# Patient Record
Sex: FEMALE | Race: WHITE | NOT HISPANIC OR LATINO | Employment: FULL TIME | URBAN - METROPOLITAN AREA
[De-identification: names, ages, dates, MRNs, and addresses within clinical notes are randomized per-mention and may not be internally consistent; named-entity substitution may affect disease eponyms.]

---

## 2017-12-13 ENCOUNTER — OFFICE VISIT (OUTPATIENT)
Dept: DERMATOLOGY | Facility: CLINIC | Age: 47
End: 2017-12-13
Payer: COMMERCIAL

## 2017-12-13 DIAGNOSIS — D48.5 NEOPLASM OF UNCERTAIN BEHAVIOR OF SKIN: Primary | ICD-10-CM

## 2017-12-13 DIAGNOSIS — L57.0 ACTINIC KERATOSIS: ICD-10-CM

## 2017-12-13 DIAGNOSIS — Z85.828 HISTORY OF SKIN CANCER: ICD-10-CM

## 2017-12-13 DIAGNOSIS — L82.1 SEBORRHEIC KERATOSIS: ICD-10-CM

## 2017-12-13 DIAGNOSIS — Z12.83 SKIN CANCER SCREENING: ICD-10-CM

## 2017-12-13 RX ORDER — TRETINOIN 0.25 MG/G
CREAM TOPICAL
Qty: 135 G | Refills: 12 | Status: SHIPPED | OUTPATIENT
Start: 2017-12-13 | End: 2021-11-17

## 2017-12-13 ASSESSMENT — PAIN SCALES - GENERAL: PAINLEVEL: NO PAIN (0)

## 2017-12-13 NOTE — PROGRESS NOTES
[facility] Dermatology Note    Dermatology Problem List:  1. Hx of NMSC  - BCC, left neck s/p biopsy 4/20/10  - SCC, sternum s/p biopsy 8/26/11  2. AKs  - s/p cryo  - s/p course of Efudex x 1 month  - tretinoin 0.025% cream  3. NUB, central upper chest s/p biopsy 12/13/17  4. Skin cancer screening 12/13/17    CC:   Chief Complaint   Patient presents with     Skin Check     Annual skin check. Personal hx of BCC and SCC, no spots of concern today.     Date of Service: Dec 13, 2017    History of Present Illness:  Ms. Brenda Carbone is a 47 year old female who presents for an annual skin check. The patient was last seen on 11/11/16 by Dr. Marte when she was reassured that there was no recurrent skin cancer. Today the patient reports that she uses the tretinoin cream, but forgets to use it. She states that she just restarted using the topical medication. She also reports that there is a spot on her chest that does not heal and is almost always a scab. . The patient reports no other lesions of concern at this time.    Otherwise, the patient reports no painful, bleeding, nonhealing, or pruritic lesions, and denies new or changing moles.    Past Medical History:   Patient Active Problem List   Diagnosis     Pain in joint, other specified sites     Adjustment disorder with mixed anxiety and depressed mood     Attention deficit hyperactivity disorder (ADHD)     Benign neoplasm of skin of trunk, except scrotum     AK (actinic keratosis)     Past Medical History:   Diagnosis Date     Actinic keratosis      Basal cell carcinoma      Closed fracture of one or more phalanges of foot 01/13/2000    (R) great toe abstract     Depressive disorder, not elsewhere classified      Hernia of unspecified site of abdominal cavity without mention of obstruction or gangrene 11/09/1998    hx (R) inguinal abstract     Post term pregnancy, unspecified episode of care 01/12/1999    childbirth baby boy abstract     Seborrheic  keratosis     (L) breast, benign     Squamous cell carcinoma      Past Surgical History:   Procedure Laterality Date     BIOPSY OF SKIN LESION       C VAGINAL HYSTERECTOMY  1/7/10     HC EXC BENIGN SKIN LESION SCLP/NCK/HNDS/FEET/GEN 1.1-2.0 CM  1/7/10    lt     HC LAP,INGUINAL HERNIA REPR,INITIAL  05/25/05     HC LAP,TUBAL CAUTERY  05/25/05     MOHS MICROGRAPHIC PROCEDURE       RW OTHER (ABSTRACTED)  1/07    breast augmentation     Social History:  Grew up in [place]. Annual trips to [facility] when she was younger for 1 month, laid in the sun for many hours.  Admits to a history of tanning abdul use.    Family History:  Not addressed at this visit.    Medications:  Current Outpatient Prescriptions   Medication Sig Dispense Refill     SUMAtriptan (IMITREX) 100 MG tablet Take 1 tablet (100 mg) by mouth See Admin Instructions WITH ONSET OF MIGRAINE, MAY REPEAT ONCE AFTER 2 HOURS. DO NOT EXCEED 2 TABLETS IN 24 HOURS. 30 tablet 3     tretinoin (RETIN-A) 0.025 % cream Use every night 135 g 12     Multiple Vitamins-Minerals (MULTIVITAMIN OR) Take 1 tablet by mouth daily       amphetamine-dextroamphetamine (ADDERALL) 10 MG tablet Take 1 tablet (10 mg) by mouth 2 times daily (Patient not taking: Reported on 12/13/2017) 60 tablet 0     Allergies:  Allergies   Allergen Reactions     No Known Allergies      No Known Drug Allergy      Review of Systems:  - Skin: As above in HPI. No additional skin concerns.    Physical exam:  Vitals: LMP 10/22/2009  GEN: This is a well developed, well-nourished female in no acute distress, in a pleasant mood.    Tanned skin.   SKIN: Full skin, which includes the head/face, both arms, chest, back, abdomen,both legs, genitalia and/or groin buttocks, digits and/or nails, was examined.  - Gritty pink papules on the left cheek, left upper forehead, left upper central chest, left breast  - Regular brown pigmented macules and papules are identified on the trunk, extremities.   - Stuck on tan to brown  papules on the back  - Scattered brown macules on the upper back, upper chest, and shoulders  - 5 mm thin pearly papule with telangiectasis on the central upper chest    - No other lesions of concern on areas examined.    Impression/Plan:  1. Hx of NMSC  - No evidence of recurrence. Continue regular skin checks.    2. Actinic keratoses - left cheek, left upper forehead, left upper central chest, left breast  - Continue tretinoin 0.025% cream - apply to face every night.  - Cryotherapy procedure note: After verbal consent and discussion of risks and benefits including but no limited to dyspigmentation/scar, blister, and pain, 4 was(were) treated with 1-2mm freeze border for 2 cycles with liquid nitrogen. Post cryotherapy instructions were provided.     3. Clinically benign nevi - trunk, extremities  - No further intervention required. Patient to report changes.   - Sun precaution was advised including the use of sun screens of SPF 30 or higher, sun protective clothing, and avoidance of tanning beds.    4. Seborrheic keratoses - back  - No further intervention required. Patient to report changes.     5. Solar lentigines - upper back, upper chest, shoulders  - No further intervention required. Patient to report changes.     6. NUB - central upper chest. Neoplasm of uncertain behavior. Differential diagnosis includes r/o BCC vs BLK vs AK vs other  - Shave biopsy:  After discussion of benefits and risks including but not limited to bleeding/bruising, pain/swelling, infection, scar, incomplete removal, nerve damage/numbness, recurrence, and non-diagnostic biopsy, written consent, verbal consent and photographs were obtained. Time-out was performed. The area was cleaned with isopropyl alcohol and was injected to obtain adequate anesthesia of the lesion on the central upper chest. 0.5 ml of 1% lidocaine was injected to obtain adequate anesthesia. A  shave biopsy was performed. Hemostasis was achieved with aluminium  chloride. Vaseline and a sterile dressing were applied. The patient tolerated the procedure and no complications were noted. The patient was provided with verbal and written post care instructions.      Follow-up in 1 year, earlier for new or changing lesions.    Staff Involved:  Staff Only    Scribe Disclosure:   I, Bharat Zelaya, am serving as a scribe to document services personally performed by Ree Pate PA-C, based on data collection and the provider's statements to me.    Provider Disclosure:   The documentation recorded by the scribe accurately reflects the services I personally performed and the decisions made by me.    All risks, benefits and alternatives were discussed with patient.  Patient is in agreement and understands the assessment and plan.  All questions were answered.  Sun Screen Education was given.   Return to Clinic annually or sooner as needed.   Ree Pate PA-C   [facility]

## 2017-12-13 NOTE — NURSING NOTE
Lidocaine  05mL once for one use, starting 12/13/2017 ending 12/13/2017,  2mL disp, R-0, injection  Injected by Roxie Chatterjee, CMA

## 2017-12-13 NOTE — PATIENT INSTRUCTIONS
Cryotherapy    What is it?    Use of a very cold liquid, such as liquid nitrogen, to freeze and destroy abnormal skin cells that need to be removed    What should I expect?    Tenderness and redness    A small blister that might grow and fill with dark purple blood. There may be crusting.    More than one treatment may be needed if the lesions do not go away.    How do I care for the treated area?    Gently wash the area with your hands when bathing.    Use a thin layer of Vaseline to help with healing. You may use a Band-Aid.     The area should heal within 7-10 days and may leave behind a pink or lighter color.     Do not use an antibiotic or Neosporin ointment.     You may take acetaminophen (Tylenol) for pain.     Call your Doctor if you have:    Severe pain    Signs of infection (warmth, redness, cloudy yellow drainage, and or a bad smell)    Questions or concerns    Who should I call with questions?       [facility]: [phone]       [facility]: [phone]       For urgent needs outside of business hours call the [facility] at [phone]        and ask for the dermatology resident on call    Wound Care After a Biopsy    What is a skin biopsy?  A skin biopsy allows the doctor to examine a very small piece of tissue under the microscope to determine the diagnosis and the best treatment for the skin condition. A local anesthetic (numbing medicine)  is injected with a very small needle into the skin area to be tested. A small piece of skin is taken from the area. Sometimes a suture (stitch) is used.     What are the risks of a skin biopsy?  I will experience scar, bleeding, swelling, pain, crusting and redness. I may experience incomplete removal or recurrence. Risks of this procedure are excessive bleeding, bruising, infection, nerve damage, numbness, thick (hypertrophic or keloidal) scar and non-diagnostic biopsy.    How should I care  for my wound for the first 24 hours?    Keep the wound dry and covered for 24 hours    If it bleeds, hold direct pressure on the area for 15 minutes. If bleeding does not stop then go to the emergency room    Avoid strenuous exercise the first 1-2 days or as your doctor instructs you    How should I care for the wound after 24 hours?    After 24 hours, remove the bandage    You may bathe or shower as normal    If you had a scalp biopsy, you can shampoo as usual and can use shower water to clean the biopsy site daily    Clean the wound twice a day with gentle soap and water    Do not scrub, be gentle    Apply white petroleum/Vaseline after cleaning the wound with a cotton swab or a clean finger, and keep the site covered with a Bandaid /bandage. Bandages are not necessary with a scalp biopsy    If you are unable to cover the site with a Bandaid /bandage, re-apply ointment 2-3 times a day to keep the site moist. Moisture will help with healing    Avoid strenuous activity for first 1-2 days    Avoid lakes, rivers, pools, and oceans until the stitches are removed or the site is healed    How do I clean my wound?    Wash hands thoroughly with soap or use hand  before all wound care    Clean the wound with gentle soap and water    Apply white petroleum/Vaseline  to wound after it is clean    Replace the Bandaid /bandage to keep the wound covered for the first few days or as instructed by your doctor    If you had a scalp biopsy, warm shower water to the area on a daily basis should suffice    What should I use to clean my wound?     Cotton-tipped applicators (Qtips )    White petroleum jelly (Vaseline ). Use a clean new container and use Q-tips to apply.    Bandaids   as needed    Gentle soap     How should I care for my wound long term?    Do not get your wound dirty    Keep up with wound care for one week or until the area is healed.    A small scab will form and fall off by itself when the area is completely  healed. The area will be red and will become pink in color as it heals. Sun protection is very important for how your scar will turn out. Sunscreen with an SPF 30 or greater is recommended once the area is healed.    You should have some soreness but it should be mild and slowly go away over several days. Talk to your doctor about using tylenol for pain,    When should I call my doctor?  If you have increased:     Pain or swelling    Pus or drainage (clear or slightly yellow drainage is ok)    Temperature over 100F    Spreading redness or warmth around wound    When will I hear about my results?  The biopsy results can take 2-3 weeks to come back. The clinic will call you with the results, send you a [employer] message, or have you schedule a follow-up clinic or phone time to discuss the results. Contact our clinics if you do not hear from us in 3 weeks.     Who should I call with questions?    [facility]: [phone]     [facility]: [phone]    For urgent needs outside of business hours call the [facility] at [phone] and ask for the dermatology resident on call

## 2017-12-13 NOTE — NURSING NOTE
Dermatology Rooming Note    Brenda Carbone's goals for this visit include:   Chief Complaint   Patient presents with     Skin Check     Annual skin check. Personal hx of BCC and SCC, no spots of concern today.     Roxie Chatterjee, CMA

## 2017-12-13 NOTE — MR AVS SNAPSHOT
After Visit Summary   12/13/2017    Brenda Carbone    MRN: [id number]           Patient Information     Date Of Birth          1970        Visit Information        Provider Department      12/13/2017 2:15 PM Ree Pate PA-C [facility] Dermatology        Today's Diagnoses     Neoplasm of uncertain behavior of skin    -  1    Actinic keratosis        Skin cancer screening        Seborrheic keratosis        History of skin cancer          Care Instructions    Cryotherapy    What is it?    Use of a very cold liquid, such as liquid nitrogen, to freeze and destroy abnormal skin cells that need to be removed    What should I expect?    Tenderness and redness    A small blister that might grow and fill with dark purple blood. There may be crusting.    More than one treatment may be needed if the lesions do not go away.    How do I care for the treated area?    Gently wash the area with your hands when bathing.    Use a thin layer of Vaseline to help with healing. You may use a Band-Aid.     The area should heal within 7-10 days and may leave behind a pink or lighter color.     Do not use an antibiotic or Neosporin ointment.     You may take acetaminophen (Tylenol) for pain.     Call your Doctor if you have:    Severe pain    Signs of infection (warmth, redness, cloudy yellow drainage, and or a bad smell)    Questions or concerns    Who should I call with questions?       [facility]: [phone]       [facility]: [phone]       For urgent needs outside of business hours call the [facility] at [phone]        and ask for the dermatology resident on call    Wound Care After a Biopsy    What is a skin biopsy?  A skin biopsy allows the doctor to examine a very small piece of tissue under the microscope to determine the diagnosis and the best treatment for the skin condition. A local anesthetic (numbing medicine)  is  injected with a very small needle into the skin area to be tested. A small piece of skin is taken from the area. Sometimes a suture (stitch) is used.     What are the risks of a skin biopsy?  I will experience scar, bleeding, swelling, pain, crusting and redness. I may experience incomplete removal or recurrence. Risks of this procedure are excessive bleeding, bruising, infection, nerve damage, numbness, thick (hypertrophic or keloidal) scar and non-diagnostic biopsy.    How should I care for my wound for the first 24 hours?    Keep the wound dry and covered for 24 hours    If it bleeds, hold direct pressure on the area for 15 minutes. If bleeding does not stop then go to the emergency room    Avoid strenuous exercise the first 1-2 days or as your doctor instructs you    How should I care for the wound after 24 hours?    After 24 hours, remove the bandage    You may bathe or shower as normal    If you had a scalp biopsy, you can shampoo as usual and can use shower water to clean the biopsy site daily    Clean the wound twice a day with gentle soap and water    Do not scrub, be gentle    Apply white petroleum/Vaseline after cleaning the wound with a cotton swab or a clean finger, and keep the site covered with a Bandaid /bandage. Bandages are not necessary with a scalp biopsy    If you are unable to cover the site with a Bandaid /bandage, re-apply ointment 2-3 times a day to keep the site moist. Moisture will help with healing    Avoid strenuous activity for first 1-2 days    Avoid lakes, rivers, pools, and oceans until the stitches are removed or the site is healed    How do I clean my wound?    Wash hands thoroughly with soap or use hand  before all wound care    Clean the wound with gentle soap and water    Apply white petroleum/Vaseline  to wound after it is clean    Replace the Bandaid /bandage to keep the wound covered for the first few days or as instructed by your doctor    If you had a scalp biopsy,  warm shower water to the area on a daily basis should suffice    What should I use to clean my wound?     Cotton-tipped applicators (Qtips )    White petroleum jelly (Vaseline ). Use a clean new container and use Q-tips to apply.    Bandaids   as needed    Gentle soap     How should I care for my wound long term?    Do not get your wound dirty    Keep up with wound care for one week or until the area is healed.    A small scab will form and fall off by itself when the area is completely healed. The area will be red and will become pink in color as it heals. Sun protection is very important for how your scar will turn out. Sunscreen with an SPF 30 or greater is recommended once the area is healed.    You should have some soreness but it should be mild and slowly go away over several days. Talk to your doctor about using tylenol for pain,    When should I call my doctor?  If you have increased:     Pain or swelling    Pus or drainage (clear or slightly yellow drainage is ok)    Temperature over 100F    Spreading redness or warmth around wound    When will I hear about my results?  The biopsy results can take 2-3 weeks to come back. The clinic will call you with the results, send you a [employer] message, or have you schedule a follow-up clinic or phone time to discuss the results. Contact our clinics if you do not hear from us in 3 weeks.     Who should I call with questions?    [facility]: [phone]     [facility]: [phone]    For urgent needs outside of business hours call the [facility] at [phone] and ask for the dermatology resident on call          Follow-ups after your visit        Follow-up notes from your care team     Return in about 1 year (around 12/13/2018).      Who to contact     Please call your clinic at [phone] to:    Ask questions about your health    Make or cancel appointments    Discuss your medicines    Learn  about your test results    Speak to your doctor   If you have compliments or concerns about an experience at your clinic, or if you wish to file a complaint, please contact [facility] Patient Relations at [phone] or email us at [email or web address]         Additional Information About Your Visit        [employer] Information     [employer] gives you secure access to your electronic health record. If you see a primary care provider, you can also send messages to your care team and make appointments. If you have questions, please call your primary care clinic.  If you do not have a primary care provider, please call [phone] and they will assist you.      [employer] [place] is an electronic gateway that provides easy, online access to your medical records. With [employer] [place], you can request a clinic appointment, read your test results, renew a prescription or communicate with your care team.     To access your existing account, please contact your [facility] or call [phone] for assistance.        Care EveryWhere ID     This is your Care EveryWhere ID. This could be used by other organizations to access your [facility] medical records  [id number]        Your Vitals Were     Last Period                   10/22/2009            Blood Pressure from Last 3 Encounters:   03/21/14 120/72   10/09/13 108/70   04/18/13 104/64    Weight from Last 3 Encounters:   03/21/14 65 kg (143 lb 4.8 oz)   10/09/13 62.4 kg (137 lb 9.1 oz)   04/18/13 63.5 kg (140 lb)              We Performed the Following     BIOPSY SKIN/SUBQ/MUC MEM, SINGLE LESION     DESTRUCT PREMALIGNANT LESION, 2-14     DESTRUCT PREMALIGNANT LESION, FIRST     Surgical pathology exam          Where to get your medicines      These medications were sent to [id number] - [place], [place] - [address]  [address] [place] [address]     Phone:  [phone]     tretinoin 0.025 % cream           Primary Care Provider Office Phone # Fax #    Phillip Nino [phone]       [facility] [place] [address]  [facility] [address]        Equal Access to Services     CYNDIGUERDA MYNOR : Hadii jennifer ku fango Soomaali, waaxda luqadaha, qaybta kaalmada adeegyada, britney garveyn radu inmanlouann elizabeth. So [facility] [phone].    ATENCIÓN: Si habla español, tiene a dennis disposición servicios gratuitos de asistencia lingüística. Llame al [phone].    We comply with applicable federal civil rights laws and [place] laws. We do not discriminate on the basis of race, color, national origin, age, disability, sex, sexual orientation, or gender identity.            Thank you!     Thank you for choosing [facility]ER[facility]  for your care. Our goal is always to provide you with excellent care. Hearing back from our patients is one way we can continue to improve our services. Please take a few minutes to complete the written survey that you may receive in the mail after your visit with us. Thank you!             Your Updated Medication List - Protect others around you: Learn how to safely use, store and throw away your medicines at www.disposemymeds.org.          This list is accurate as of: 12/13/17  8:13 PM.  Always use your most recent med list.                   Brand Name Dispense Instructions for use Diagnosis    amphetamine-dextroamphetamine 10 MG per tablet    ADDERALL    60 tablet    Take 1 tablet (10 mg) by mouth 2 times daily    Attention deficit disorder with hyperactivity(314.01)       MULTIVITAMIN PO      Take 1 tablet by mouth daily        SUMAtriptan 100 MG tablet    IMITREX    30 tablet    Take 1 tablet (100 mg) by mouth See Admin Instructions WITH ONSET OF MIGRAINE, MAY REPEAT ONCE AFTER 2 HOURS. DO NOT EXCEED 2 TABLETS IN 24 HOURS.    Migraine, unspecified, with intractable migraine, so stated, without mention of status migrainosus       tretinoin 0.025 % cream    RETIN-A    135 g     Use every night    Actinic keratosis

## 2017-12-13 NOTE — LETTER
12/13/2017       RE: Brenda Carbone  [address] [address]     Dear Colleague,    Thank you for referring your patient, Brenda Carbone, to the [facility] DERMATOLOGY at [facility]. Please see a copy of my visit note below.    [facility] Dermatology Note    Dermatology Problem List:  1. Hx of NMSC  - BCC, left neck s/p biopsy 4/20/10  - SCC, sternum s/p biopsy 8/26/11  2. AKs  - s/p cryo  - s/p course of Efudex x 1 month  - tretinoin 0.025% cream  3. NUB, central upper chest s/p biopsy 12/13/17  4. Skin cancer screening 12/13/17    CC:   Chief Complaint   Patient presents with     Skin Check     Annual skin check. Personal hx of BCC and SCC, no spots of concern today.     Date of Service: Dec 13, 2017    History of Present Illness:  Ms. Brenda Carbone is a 47 year old female who presents for an annual skin check. The patient was last seen on 11/11/16 by Dr. Marte when she was reassured that there was no recurrent skin cancer. Today the patient reports that she uses the tretinoin cream, but forgets to use it. She states that she just restarted using the topical medication. She also reports that there is a spot on her chest that does not heal and is almost always a scab. . The patient reports no other lesions of concern at this time.    Otherwise, the patient reports no painful, bleeding, nonhealing, or pruritic lesions, and denies new or changing moles.    Past Medical History:   Patient Active Problem List   Diagnosis     Pain in joint, other specified sites     Adjustment disorder with mixed anxiety and depressed mood     Attention deficit hyperactivity disorder (ADHD)     Benign neoplasm of skin of trunk, except scrotum     AK (actinic keratosis)     Past Medical History:   Diagnosis Date     Actinic keratosis      Basal cell carcinoma      Closed fracture of one or more phalanges of foot 01/13/2000    (R) great toe abstract     Depressive  disorder, not elsewhere classified      Hernia of unspecified site of abdominal cavity without mention of obstruction or gangrene 11/09/1998    hx (R) inguinal abstract     Post term pregnancy, unspecified episode of care 01/12/1999    childbirth baby boy abstract     Seborrheic keratosis     (L) breast, benign     Squamous cell carcinoma      Past Surgical History:   Procedure Laterality Date     BIOPSY OF SKIN LESION       C VAGINAL HYSTERECTOMY  1/7/10     HC EXC BENIGN SKIN LESION SCLP/NCK/HNDS/FEET/GEN 1.1-2.0 CM  1/7/10    lt     HC LAP,INGUINAL HERNIA REPR,INITIAL  05/25/05     HC LAP,TUBAL CAUTERY  05/25/05     MOHS MICROGRAPHIC PROCEDURE       RW OTHER (ABSTRACTED)  1/07    breast augmentation     Social History:  Grew up in [place]. Annual trips to [place] [facility] when she was younger for 1 month, laid in the sun for many hours.  Admits to a history of tanning abdul use.    Family History:  Not addressed at this visit.    Medications:  Current Outpatient Prescriptions   Medication Sig Dispense Refill     SUMAtriptan (IMITREX) 100 MG tablet Take 1 tablet (100 mg) by mouth See Admin Instructions WITH ONSET OF MIGRAINE, MAY REPEAT ONCE AFTER 2 HOURS. DO NOT EXCEED 2 TABLETS IN 24 HOURS. 30 tablet 3     tretinoin (RETIN-A) 0.025 % cream Use every night 135 g 12     Multiple Vitamins-Minerals (MULTIVITAMIN OR) Take 1 tablet by mouth daily       amphetamine-dextroamphetamine (ADDERALL) 10 MG tablet Take 1 tablet (10 mg) by mouth 2 times daily (Patient not taking: Reported on 12/13/2017) 60 tablet 0     Allergies:  Allergies   Allergen Reactions     No Known Allergies      No Known Drug Allergy      Review of Systems:  - Skin: As above in HPI. No additional skin concerns.    Physical exam:  Vitals: LMP 10/22/2009  GEN: This is a well developed, well-nourished female in no acute distress, in a pleasant mood.    Tanned skin.   SKIN: Full skin, which includes the head/face, both arms, chest, back, abdomen,both legs,  genitalia and/or groin buttocks, digits and/or nails, was examined.  - Gritty pink papules on the left cheek, left upper forehead, left upper central chest, left breast  - Regular brown pigmented macules and papules are identified on the trunk, extremities.   - Stuck on tan to brown papules on the back  - Scattered brown macules on the upper back, upper chest, and shoulders  - 5 mm thin pearly papule with telangiectasis on the central upper chest    - No other lesions of concern on areas examined.    Impression/Plan:  1. Hx of NMSC  - No evidence of recurrence. Continue regular skin checks.    2. Actinic keratoses - left cheek, left upper forehead, left upper central chest, left breast  - Continue tretinoin 0.025% cream - apply to face every night.  - Cryotherapy procedure note: After verbal consent and discussion of risks and benefits including but no limited to dyspigmentation/scar, blister, and pain, 4 was(were) treated with 1-2mm freeze border for 2 cycles with liquid nitrogen. Post cryotherapy instructions were provided.     3. Clinically benign nevi - trunk, extremities  - No further intervention required. Patient to report changes.   - Sun precaution was advised including the use of sun screens of SPF 30 or higher, sun protective clothing, and avoidance of tanning beds.    4. Seborrheic keratoses - back  - No further intervention required. Patient to report changes.     5. Solar lentigines - upper back, upper chest, shoulders  - No further intervention required. Patient to report changes.     6. NUB - central upper chest. Neoplasm of uncertain behavior. Differential diagnosis includes r/o BCC vs BLK vs AK vs other  - Shave biopsy:  After discussion of benefits and risks including but not limited to bleeding/bruising, pain/swelling, infection, scar, incomplete removal, nerve damage/numbness, recurrence, and non-diagnostic biopsy, written consent, verbal consent and photographs were obtained. Time-out was  performed. The area was cleaned with isopropyl alcohol and was injected to obtain adequate anesthesia of the lesion on the central upper chest. 0.5 ml of 1% lidocaine was injected to obtain adequate anesthesia. A  shave biopsy was performed. Hemostasis was achieved with aluminium chloride. Vaseline and a sterile dressing were applied. The patient tolerated the procedure and no complications were noted. The patient was provided with verbal and written post care instructions.      Follow-up in 1 year, earlier for new or changing lesions.    Staff Involved:  Staff Only    Scribe Disclosure:   Bharat WHEATLEY, am serving as a scribe to document services personally performed by Ree Pate PA-C, based on data collection and the provider's statements to me.    Provider Disclosure:   The documentation recorded by the scribe accurately reflects the services I personally performed and the decisions made by me.    All risks, benefits and alternatives were discussed with patient.  Patient is in agreement and understands the assessment and plan.  All questions were answered.  Sun Screen Education was given.   Return to Clinic annually or sooner as needed.   Ree Pate PA-C   [facility] Dermatology Clinic

## 2017-12-15 ENCOUNTER — TELEPHONE (OUTPATIENT)
Dept: DERMATOLOGY | Facility: CLINIC | Age: 47
End: 2017-12-15

## 2017-12-15 NOTE — TELEPHONE ENCOUNTER
Central Prior Authorization Team   Phone: [phone]      PA Initiation man faxed     Medication: tretinoin (RETIN-A) 0.025 % cream - pa in\[facility] [place] [facility]\""[facility]  Insurance Company: [employer] - Phone [phone] Fax [phone]  Pharmacy Filling the Rx: [facility] [place]  Filling Pharmacy Phone: [phone]  Filling Pharmacy Fax:    Start Date: 12/15/2017

## 2017-12-15 NOTE — TELEPHONE ENCOUNTER
Central Prior Authorization Team   Phone: [phone]    Prior Authorization Approval    Authorization Effective Date: 12/15/2017  Authorization Expiration Date: 12/15/2018  Medication: tretinoin (RETIN-A) 0.025 % cream - pa approved   Reference #:   [id number]  Insurance Company: [employer] - Phone [phone] Fax [phone]  Expected CoPay: 19.10  Which Pharmacy is filling the prescription (Not needed for infusion/clinic administered): [facility] [place]  Pharmacy Notified: Yes  Patient Notified: Yes

## 2017-12-18 LAB — COPATH REPORT: NORMAL

## 2019-11-03 ENCOUNTER — HEALTH MAINTENANCE LETTER (OUTPATIENT)
Age: 49
End: 2019-11-03

## 2020-11-16 ENCOUNTER — HEALTH MAINTENANCE LETTER (OUTPATIENT)
Age: 50
End: 2020-11-16

## 2021-02-07 ENCOUNTER — HEALTH MAINTENANCE LETTER (OUTPATIENT)
Age: 51
End: 2021-02-07

## 2021-09-18 ENCOUNTER — HEALTH MAINTENANCE LETTER (OUTPATIENT)
Age: 51
End: 2021-09-18

## 2021-11-17 ENCOUNTER — OFFICE VISIT (OUTPATIENT)
Dept: DERMATOLOGY | Facility: CLINIC | Age: 51
End: 2021-11-17
Payer: COMMERCIAL

## 2021-11-17 DIAGNOSIS — L81.4 SOLAR LENTIGINOSIS: ICD-10-CM

## 2021-11-17 DIAGNOSIS — L57.0 ACTINIC KERATOSIS: ICD-10-CM

## 2021-11-17 DIAGNOSIS — Z12.83 SKIN CANCER SCREENING: Primary | ICD-10-CM

## 2021-11-17 DIAGNOSIS — D48.9 NEOPLASM OF UNCERTAIN BEHAVIOR: ICD-10-CM

## 2021-11-17 DIAGNOSIS — D22.9 MULTIPLE PIGMENTED NEVI: ICD-10-CM

## 2021-11-17 DIAGNOSIS — L82.1 SEBORRHEIC KERATOSIS: ICD-10-CM

## 2021-11-17 PROCEDURE — 99203 OFFICE O/P NEW LOW 30 MIN: CPT | Mod: 25 | Performed by: PHYSICIAN ASSISTANT

## 2021-11-17 PROCEDURE — 11102 TANGNTL BX SKIN SINGLE LES: CPT | Performed by: PHYSICIAN ASSISTANT

## 2021-11-17 PROCEDURE — 88305 TISSUE EXAM BY PATHOLOGIST: CPT | Mod: 26 | Performed by: DERMATOLOGY

## 2021-11-17 PROCEDURE — 88305 TISSUE EXAM BY PATHOLOGIST: CPT | Mod: TC | Performed by: PHYSICIAN ASSISTANT

## 2021-11-17 PROCEDURE — 17000 DESTRUCT PREMALG LESION: CPT | Mod: XS | Performed by: PHYSICIAN ASSISTANT

## 2021-11-17 RX ORDER — CALCIPOTRIENE 50 UG/G
OINTMENT TOPICAL
Qty: 60 G | Refills: 1 | Status: SHIPPED | OUTPATIENT
Start: 2021-11-17 | End: 2022-11-22

## 2021-11-17 RX ORDER — TRETINOIN 0.25 MG/G
CREAM TOPICAL
Qty: 135 G | Refills: 12 | Status: SHIPPED | OUTPATIENT
Start: 2021-11-17

## 2021-11-17 RX ORDER — FLUOROURACIL 50 MG/G
CREAM TOPICAL 2 TIMES DAILY
Qty: 40 G | Refills: 1 | Status: SHIPPED | OUTPATIENT
Start: 2021-11-17 | End: 2023-01-09

## 2021-11-17 ASSESSMENT — PAIN SCALES - GENERAL: PAINLEVEL: NO PAIN (0)

## 2021-11-17 NOTE — PROGRESS NOTES
[facility] Dermatology Note  Encounter Date: Nov 17, 2021  Office Visit     Dermatology Problem List:  #NUB  - Left preauricular cheek area r/o BCC  - S/p shave biopsy 11/17/2021  1. Hx of NMSC  - BCC, left neck s/p biopsy 4/20/10  - SCC, sternum s/p biopsy 8/26/11  2. AKs  - s/p cryo  - s/p course of Efudex x 1 month  - Current tx: tretinoin 0.025% cream, calcipotriene 0.005% ointment to the forehead, temples, cheeks, and nose BID for 1 week, self mix equal parts with efudex 5% cream  3. Pigmented AK, central upper chest s/p biopsy 12/13/17  4. Skin cancer screening 11/17/2021  ____________________________________________    Assessment & Plan:      #NUB  - Left preauricular cheek area r/o BCC  - Shave biopsy today, see procedure note below    # AK, left upper back  - Cryo today, see procedure note below    # AKs, scattered to the forehead, cheeks, and nose  - Start calcipotriene 0.005% ointment to the forehead, temples, cheeks, and nose BID for 1 week, self mix equal parts with efudex 5% cream  - Continue tretinoin 0.025% cream nightly     # Multiple benign nevi  # Solar lentigines  - No concerning lesions today  - Counseled on ABCDEs of melanoma and sun protection - recommend SPF 30 or higher with frequent application  - Return sooner if noticing changing or symptomatic lesions     # Seborrheic keratoses   Discussed the natural history and benign nature of this lesion.  - Reassurance provided that no additional treatment is necessary.     # Dyschromia, left great nail plate.   -Resolving trauma vs early onychomycosis vs other  -Photo documentation performed.   -Will recheck in office. Let nail grow.     # History of NMSC. No evidence of recurrent disease.  - Continue photoprotection - recommend SPF 30 or higher with frequent reapplication  - Continue yearly skin exams  - Advised to monitor for changing, non-healing, bleeding, painful, changing, or otherwise symptomatic lesions      Procedures  Performed:   - Shave biopsy procedure note, location(s): See above. After discussion of benefits and risks including but not limited to bleeding, infection, scar, incomplete removal, recurrence, and non-diagnostic biopsy, written consent and photographs were obtained. The area was cleaned with isopropyl alcohol. 0.5mL of 1% lidocaine with epinephrine was injected to obtain adequate anesthesia of lesion(s). Shave biopsy at site(s) performed. Hemostasis was achieved with aluminium chloride. Petrolatum ointment and a sterile dressing were applied. The patient tolerated the procedure and no complications were noted. The patient was provided with verbal and written post care instructions.     - Cryotherapy procedure note, location(s): See above. After verbal consent and discussion of risks and benefits including, but not limited to, dyspigmentation/scar, blister, and pain, 1 AK(s) was(were) treated with 1-2 mm freeze border for 1-2 cycles with liquid nitrogen. Post cryotherapy instructions were provided.    Follow-up: pending path results and in 3 months     Staff and Scribe:     Scribe Disclosure:  I, Yessenia Douglass, am serving as a scribe to document services personally performed by Ree Pate PA-C based on data collection and the provider's statements to me.     Provider Disclosure:   The documentation recorded by the scribe accurately reflects the services I personally performed and the decisions made by me.    All risks, benefits and alternatives were discussed with patient.  Patient is in agreement and understands the assessment and plan.  All questions were answered.  Sun Screen Education was given.   Return to Clinic in 3 months or sooner as needed.   Ree Pate PA-C   [facility] Dermatology Clinic   ____________________________________________    CC: Skin Check (annual)    HPI:  Ms. Brenda Carbone is a(n) 51 year old female who presents today as a return patient for FBSE. The patient  was last seen in dermatology by myself on 12/13/17 at which time AKs on the left cheek, forehead, chest, and breast were treated with cryo and she was continued on tretinoin 0.025% cream. Additionally, a shave biopsy of the central upper chest was performed, and pathology results revealed pigmented AK.    Today, the patient reports a spot of concern on her left forearm. She also reports some fungus in her left great toenail since having a pedicure. She denies known injuries but believes there may have been some unknown trauma. She notes that she occasionally wears sunscreen.     Patient is otherwise feeling well, without additional skin concerns.    Labs Reviewed:  N/A    Physical Exam:  Vitals: LMP 10/22/2009   SKIN: Total skin excluding the undergarment areas was performed. The exam included the head/face, neck, both arms, chest, back, abdomen, both legs, digits and/or nails.   -  Gritty pink papules scattered to the forehead, cheeks, and nose  - There is a waxy stuck on tan to brown papule on the left forearm.   - There is an erythematous macule with overyling adherent scale on the left upper back.   - Multiple regular brown pigmented macules and papules are identified on the trunk and extremities.   - Scattered brown macules on sun exposed areas.  - There are waxy stuck on tan to brown papules on the trunk and extremities.   -There is dyschromia on the left great nail plate.  - No other lesions of concern on areas examined.     Medications:  Current Outpatient Medications   Medication     SUMAtriptan (IMITREX) 100 MG tablet     tretinoin (RETIN-A) 0.025 % cream     amphetamine-dextroamphetamine (ADDERALL) 10 MG tablet     Multiple Vitamins-Minerals (MULTIVITAMIN OR)     No current facility-administered medications for this visit.      Past Medical History:   Patient Active Problem List   Diagnosis     Pain in joint, other specified sites     Adjustment disorder with mixed anxiety and depressed mood     Attention  deficit hyperactivity disorder (ADHD)     Benign neoplasm of skin of trunk, except scrotum     AK (actinic keratosis)     Past Medical History:   Diagnosis Date     Actinic keratosis      Basal cell carcinoma      Closed fracture of one or more phalanges of foot 01/13/2000    (R) great toe abstract     Depressive disorder, not elsewhere classified      Hernia of unspecified site of abdominal cavity without mention of obstruction or gangrene 11/09/1998    hx (R) inguinal abstract     Post term pregnancy, unspecified episode of care 01/12/1999    childbirth baby boy abstract     Seborrheic keratosis     (L) breast, benign     Squamous cell carcinoma                 CC Referred Self, MD  No address on file on close of this encounter.

## 2021-11-17 NOTE — NURSING NOTE
Chief Complaint   Patient presents with     Skin Check     annual     Pt is here for an annual skin check.  She has a spot on her left arm.   Yessenia Anderson LPN on 11/17/2021 at 1:38 PM

## 2021-11-17 NOTE — LETTER
11/17/2021       RE: Brenda Carbone  [address] [address]     Dear Colleague,    Thank you for referring your patient, Brenda Carbone, to the [facility] DERMATOLOGY CLINIC [place] at [facility]. Please see a copy of my visit note below.    [facility] Dermatology Note  Encounter Date: Nov 17, 2021  Office Visit     Dermatology Problem List:  #NUB  - Left preauricular cheek area r/o BCC  - S/p shave biopsy 11/17/2021  1. Hx of NMSC  - BCC, left neck s/p biopsy 4/20/10  - SCC, sternum s/p biopsy 8/26/11  2. AKs  - s/p cryo  - s/p course of Efudex x 1 month  - Current tx: tretinoin 0.025% cream, calcipotriene 0.005% ointment to the forehead, temples, cheeks, and nose BID for 1 week, self mix equal parts with efudex 5% cream  3. Pigmented AK, central upper chest s/p biopsy 12/13/17  4. Skin cancer screening 11/17/2021  ____________________________________________    Assessment & Plan:      #NUB  - Left preauricular cheek area r/o BCC  - Shave biopsy today, see procedure note below    # AK, left upper back  - Cryo today, see procedure note below    # AKs, scattered to the forehead, cheeks, and nose  - Start calcipotriene 0.005% ointment to the forehead, temples, cheeks, and nose BID for 1 week, self mix equal parts with efudex 5% cream  - Continue tretinoin 0.025% cream nightly     # Multiple benign nevi  # Solar lentigines  - No concerning lesions today  - Counseled on ABCDEs of melanoma and sun protection - recommend SPF 30 or higher with frequent application  - Return sooner if noticing changing or symptomatic lesions     # Seborrheic keratoses   Discussed the natural history and benign nature of this lesion.  - Reassurance provided that no additional treatment is necessary.     # Dyschromia, left great nail plate.   -Resolving trauma vs early onychomycosis vs other  -Photo documentation performed.   -Will recheck in office.  Let nail grow.     # History of NMSC. No evidence of recurrent disease.  - Continue photoprotection - recommend SPF 30 or higher with frequent reapplication  - Continue yearly skin exams  - Advised to monitor for changing, non-healing, bleeding, painful, changing, or otherwise symptomatic lesions      Procedures Performed:   - Shave biopsy procedure note, location(s): See above. After discussion of benefits and risks including but not limited to bleeding, infection, scar, incomplete removal, recurrence, and non-diagnostic biopsy, written consent and photographs were obtained. The area was cleaned with isopropyl alcohol. 0.5mL of 1% lidocaine with epinephrine was injected to obtain adequate anesthesia of lesion(s). Shave biopsy at site(s) performed. Hemostasis was achieved with aluminium chloride. Petrolatum ointment and a sterile dressing were applied. The patient tolerated the procedure and no complications were noted. The patient was provided with verbal and written post care instructions.     - Cryotherapy procedure note, location(s): See above. After verbal consent and discussion of risks and benefits including, but not limited to, dyspigmentation/scar, blister, and pain, 1 AK(s) was(were) treated with 1-2 mm freeze border for 1-2 cycles with liquid nitrogen. Post cryotherapy instructions were provided.    Follow-up: pending path results and in 3 months     Staff and Scribe:     Scribe Disclosure:  I, Yessenia Douglass, am serving as a scribe to document services personally performed by Ree Pate PA-C based on data collection and the provider's statements to me.     Provider Disclosure:   The documentation recorded by the scribe accurately reflects the services I personally performed and the decisions made by me.    All risks, benefits and alternatives were discussed with patient.  Patient is in agreement and understands the assessment and plan.  All questions were answered.  Sun Screen Education was  given.   Return to Clinic in 3 months or sooner as needed.   Ree Pate PA-C   [facility] Dermatology [facility]   ____________________________________________    CC: Skin Check (annual)    HPI:  Ms. Brenda Carbone is a(n) 51 year old female who presents today as a return patient for FBSE. The patient was last seen in dermatology by myself on 12/13/17 at which time AKs on the left cheek, forehead, chest, and breast were treated with cryo and she was continued on tretinoin 0.025% cream. Additionally, a shave biopsy of the central upper chest was performed, and pathology results revealed pigmented AK.    Today, the patient reports a spot of concern on her left forearm. She also reports some fungus in her left great toenail since having a pedicure. She denies known injuries but believes there may have been some unknown trauma. She notes that she occasionally wears sunscreen.     Patient is otherwise feeling well, without additional skin concerns.    Labs Reviewed:  N/A    Physical Exam:  Vitals: LMP 10/22/2009   SKIN: Total skin excluding the undergarment areas was performed. The exam included the head/face, neck, both arms, chest, back, abdomen, both legs, digits and/or nails.   -  Gritty pink papules scattered to the forehead, cheeks, and nose  - There is a waxy stuck on tan to brown papule on the left forearm.   - There is an erythematous macule with overyling adherent scale on the left upper back.   - Multiple regular brown pigmented macules and papules are identified on the trunk and extremities.   - Scattered brown macules on sun exposed areas.  - There are waxy stuck on tan to brown papules on the trunk and extremities.   -There is dyschromia on the left great nail plate.  - No other lesions of concern on areas examined.     Medications:  Current Outpatient Medications   Medication     SUMAtriptan (IMITREX) 100 MG tablet     tretinoin (RETIN-A) 0.025 % cream     amphetamine-dextroamphetamine  (ADDERALL) 10 MG tablet     Multiple Vitamins-Minerals (MULTIVITAMIN OR)     No current facility-administered medications for this visit.      Past Medical History:   Patient Active Problem List   Diagnosis     Pain in joint, other specified sites     Adjustment disorder with mixed anxiety and depressed mood     Attention deficit hyperactivity disorder (ADHD)     Benign neoplasm of skin of trunk, except scrotum     AK (actinic keratosis)     Past Medical History:   Diagnosis Date     Actinic keratosis      Basal cell carcinoma      Closed fracture of one or more phalanges of foot 01/13/2000    (R) great toe abstract     Depressive disorder, not elsewhere classified      Hernia of unspecified site of abdominal cavity without mention of obstruction or gangrene 11/09/1998    hx (R) inguinal abstract     Post term pregnancy, unspecified episode of care 01/12/1999    childbirth baby boy abstract     Seborrheic keratosis     (L) breast, benign     Squamous cell carcinoma                 CC Referred Self, MD  No address on file on close of this encounter.

## 2021-11-19 NOTE — PATIENT INSTRUCTIONS
Cryotherapy    What is it?    Use of a very cold liquid, such as liquid nitrogen, to freeze and destroy abnormal skin cells that need to be removed    What should I expect?    Tenderness and redness    A small blister that might grow and fill with dark purple blood. There may be crusting.    More than one treatment may be needed if the lesions do not go away.    How do I care for the treated area?    Gently wash the area with your hands when bathing.    Use a thin layer of Vaseline to help with healing. You may use a Band-Aid.     The area should heal within 7-10 days and may leave behind a pink or lighter color.     Do not use an antibiotic or Neosporin ointment.     You may take acetaminophen (Tylenol) for pain.     Call your doctor if you have:    Severe pain    Signs of infection (warmth, redness, cloudy yellow drainage, and or a bad smell)    Questions or concerns    Who should I call with questions?       [facility]: [phone]       [facility]: [phone]       For urgent needs outside of business hours call the [facility] at [phone] and ask for the dermatology resident on call  Wound Care After a Biopsy    What is a skin biopsy?  A skin biopsy allows the doctor to examine a very small piece of tissue under the microscope to determine the diagnosis and the best treatment for the skin condition. A local anesthetic (numbing medicine)  is injected with a very small needle into the skin area to be tested. A small piece of skin is taken from the area. Sometimes a suture (stitch) is used.     What are the risks of a skin biopsy?  I will experience scar, bleeding, swelling, pain, crusting and redness. I may experience incomplete removal or recurrence. Risks of this procedure are excessive bleeding, bruising, infection, nerve damage, numbness, thick (hypertrophic or keloidal) scar and non-diagnostic biopsy.    How should I care for my  wound for the first 24 hours?    Keep the wound dry and covered for 24 hours    If it bleeds, hold direct pressure on the area for 15 minutes. If bleeding does not stop then go to the emergency room    Avoid strenuous exercise the first 1-2 days or as your doctor instructs you    How should I care for the wound after 24 hours?    After 24 hours, remove the bandage    You may bathe or shower as normal    If you had a scalp biopsy, you can shampoo as usual and can use shower water to clean the biopsy site daily    Clean the wound twice a day with gentle soap and water    Do not scrub, be gentle    Apply white petroleum/Vaseline after cleaning the wound with a cotton swab or a clean finger, and keep the site covered with a Bandaid /bandage. Bandages are not necessary with a scalp biopsy    If you are unable to cover the site with a Bandaid /bandage, re-apply ointment 2-3 times a day to keep the site moist. Moisture will help with healing    Avoid strenuous activity for first 1-2 days    Avoid lakes, rivers, pools, and oceans until the stitches are removed or the site is healed    How do I clean my wound?    Wash hands thoroughly with soap or use hand  before all wound care    Clean the wound with gentle soap and water    Apply white petroleum/Vaseline  to wound after it is clean    Replace the Bandaid /bandage to keep the wound covered for the first few days or as instructed by your doctor    If you had a scalp biopsy, warm shower water to the area on a daily basis should suffice    What should I use to clean my wound?     Cotton-tipped applicators (Qtips )    White petroleum jelly (Vaseline ). Use a clean new container and use Q-tips to apply.    Bandaids   as needed    Gentle soap     How should I care for my wound long term?    Do not get your wound dirty    Keep up with wound care for one week or until the area is healed.    A small scab will form and fall off by itself when the area is completely healed.  The area will be red and will become pink in color as it heals. Sun protection is very important for how your scar will turn out. Sunscreen with an SPF 30 or greater is recommended once the area is healed.      You should have some soreness but it should be mild and slowly go away over several days. Talk to your doctor about using tylenol for pain,    When should I call my doctor?  If you have increased:     Pain or swelling    Pus or drainage (clear or slightly yellow drainage is ok)    Temperature over 100F    Spreading redness or warmth around wound    When will I hear about my results?  The biopsy results can take 2 weeks to come back.  Your results will automatically release to [employer] before your provider has even reviewed them.  The clinic will call you with the results, send you a Zencoder message, or have you schedule a follow-up clinic or phone time to discuss the results.  Contact our clinics if you do not hear from us in 2 weeks.    Who should I call with questions?    [facility]: [phone]    [facility]: [phone]    For urgent needs outside of business hours call the [facility] at [phone] and ask for the dermatology resident on call

## 2021-11-21 LAB
PATH REPORT.COMMENTS IMP SPEC: ABNORMAL
PATH REPORT.COMMENTS IMP SPEC: ABNORMAL
PATH REPORT.COMMENTS IMP SPEC: YES
PATH REPORT.FINAL DX SPEC: ABNORMAL
PATH REPORT.GROSS SPEC: ABNORMAL
PATH REPORT.MICROSCOPIC SPEC OTHER STN: ABNORMAL
PATH REPORT.RELEVANT HX SPEC: ABNORMAL

## 2021-11-24 ENCOUNTER — TELEPHONE (OUTPATIENT)
Dept: DERMATOLOGY | Facility: CLINIC | Age: 51
End: 2021-11-24
Payer: COMMERCIAL

## 2021-11-24 DIAGNOSIS — C44.91 BCC (BASAL CELL CARCINOMA): Primary | ICD-10-CM

## 2021-11-24 NOTE — TELEPHONE ENCOUNTER
Called to give results: BCC L preauricular cheek. Referral to derm surg in chart. Pt needs to be scheduled for consult with Dr. Birch or Dr. Llamas. [facility] with number

## 2021-12-09 ENCOUNTER — TELEPHONE (OUTPATIENT)
Dept: DERMATOLOGY | Facility: CLINIC | Age: 51
End: 2021-12-09
Payer: COMMERCIAL

## 2021-12-09 ENCOUNTER — MYC MEDICAL ADVICE (OUTPATIENT)
Dept: DERMATOLOGY | Facility: CLINIC | Age: 51
End: 2021-12-09
Payer: COMMERCIAL

## 2021-12-18 NOTE — TELEPHONE ENCOUNTER
FUTURE VISIT INFORMATION      FUTURE VISIT INFORMATION:    Date: 2.1.22    Time: 9:30    Location: [facility]  REFERRAL INFORMATION:    Referring provider:  Ree Pate    Referring providers clinic:  [facility]    Reason for visit/diagnosis  Mohs    RECORDS REQUESTED FROM:       Clinic name Comments Records Status Photos Status   [facility] 11.17.21  Path # [id number] Epic Epic

## 2022-01-08 ENCOUNTER — HEALTH MAINTENANCE LETTER (OUTPATIENT)
Age: 52
End: 2022-01-08

## 2022-01-18 NOTE — TELEPHONE ENCOUNTER
The pt called asking to move her 2.1.22 appt to a Friday appt. Please call the pt to discuss. Thanks.

## 2022-01-19 NOTE — TELEPHONE ENCOUNTER
Left patient voicemail letting her know we do not have any MOHS surgeries on fridays. Advised her to callback if she had any further questions.    Verónica Gremain, Procedure

## 2022-01-20 ENCOUNTER — TELEPHONE (OUTPATIENT)
Dept: DERMATOLOGY | Facility: CLINIC | Age: 52
End: 2022-01-20
Payer: COMMERCIAL

## 2022-01-20 NOTE — TELEPHONE ENCOUNTER
Patient requested that I call her back about her MOHS surgery. I called her and she had some questions about what goes on after the surgery. I sent her the MOHS quick tips in [employer]. She stated that she will reply to the message in [employer]ix if she has any further questions.    Verónica Germain, Procedure

## 2022-02-01 ENCOUNTER — PRE VISIT (OUTPATIENT)
Dept: DERMATOLOGY | Facility: CLINIC | Age: 52
End: 2022-02-01
Payer: COMMERCIAL

## 2022-02-24 ENCOUNTER — OFFICE VISIT (OUTPATIENT)
Dept: DERMATOLOGY | Facility: CLINIC | Age: 52
End: 2022-02-24
Attending: PHYSICIAN ASSISTANT
Payer: COMMERCIAL

## 2022-02-24 VITALS — SYSTOLIC BLOOD PRESSURE: 118 MMHG | DIASTOLIC BLOOD PRESSURE: 78 MMHG | HEART RATE: 73 BPM

## 2022-02-24 DIAGNOSIS — C44.319 BASAL CELL CARCINOMA (BCC) OF SKIN OF OTHER PART OF FACE: ICD-10-CM

## 2022-02-24 PROCEDURE — 14040 TIS TRNFR F/C/C/M/N/A/G/H/F: CPT | Performed by: DERMATOLOGY

## 2022-02-24 PROCEDURE — 17311 MOHS 1 STAGE H/N/HF/G: CPT | Performed by: DERMATOLOGY

## 2022-02-24 ASSESSMENT — PAIN SCALES - GENERAL: PAINLEVEL: NO PAIN (0)

## 2022-02-24 NOTE — PROGRESS NOTES
[facility] Mohs Surgery Procedure Note    Case #: 1  Date of Service:  Feb 24, 2022  Surgery: Mohs micrographic surgery (MMS)  Staff surgeon: Pavel Birch MD  Fellow surgeon: None  Resident surgeon: None  Nurse: Sonia Smith CMA    Tumor Type: BCC  Location: left temple  Derm-Path Accession #: [id number]       Mohs Accession #:   Pre-Op Size: 1 cm x 1 cm  Final Defect Size: 1.8 cm x 1.8 cm  Number of Mohs stages: 1  Level of Defect: Fat  Local anesthetic: 6 mL 1% lidocaine with epinephrine  Repair Type: Advancement Flap  Repair Size: 2.2 cm2  Suture Material: 4-0 Monocryl; 5-0 fast absorbing gut    Procedure:    Stage I  We discussed the principles of treatment and most likely complications including scarring, bleeding, infection, swelling, pain, crusting, nerve damage, large wound,  incomplete excision, wound dehiscence,  nerve damage, recurrence, and a second procedure may be recommended to obtain the best cosmetic or functional result.    Informed consent was obtained and the patient underwent the procedure as follows:  The patient was placed supine on the operating table.  The cancer was identified, outlined with a marker, and verified by the patient.  The entire surgical field was prepped with chlorhexidine.  The surgical site was anesthetized using lidocaine with epinephrine.    The area of clinically apparent tumor was debulked. The layer of tissue was then surgically excised using a #15 blade and was then transferred onto a specimen sheet maintaining the orientation of the specimen. Hemostasis was obtained using electrocoagulation. The wound site was then covered with a dressing while the tissue samples were processed for examination.    The excised tissue was transported to the Mohs histology laboratory maintaining the tissue orientation.  The tissue specimen was relaxed so that the entire surgical margin was in a a single horizontal plane for sectioning and inked for precise  mapping.  A precise reference map was drawn to reflect the sectioning of the specimen, colored inking of the margins, and orientation on the patient.  The tissue was processed using horizontal sectioning of the base and continuous peripheral margins.  The histopathologic sections were reviewed in conjunction with the reference map.    Total blocks: 1  Total slides: 2    There were no cancer cells visualized on examination, therefore Mohs surgery was complete.    Reconstruction: Advancement Flap    PROCEDURE:  The wound was debeveled and undermined broadly in all directions to the level of fat. Hemostasis was obtained using electrocoagulation. The advancement flap was incised to the level of fat removing a cone of redundant tissue from laterally. The flap was further undermined in all directions.    Hemostasis was again obtained as above.  The flap was then advanced into the defect and secured using buried dermal sutures.  Redundant areas of tissue were excised using the triangulation technique.  The flap wound edges of both the primary and secondary defects were then approximated with buried dermal sutures, and the epidermis was then carefully approximated using 5-0 fast absorbing gut sutures.  The wound was cleansed with sterile saline, and ointment was applied along the wound surface.  A sterile pressure dressing of non-adherent gauze was applied and wound care instructions were given verbally and in writing. The patient left the operating suite in stable condition. Derma-Abrasion can be used as a second stage of this reconstruction to enhance cosmesis.    Dr. Pavel Birch performed the Mohs micrographic surgery and reconstruction.    Scribe Disclosure:  DIONI, Norberto Phipps, am serving as a scribe to document services personally performed by Pavel Birch MD based on data collection and the provider's statements to me.       Attending attestation:  I was present for the entirety of the procedure and immediately  available for all other portions of the procedure.  I have reviewed the note and edited it as necessary.    Pavel Birch M.D.  Professor  Director of Dermatologic Surgery  Department of Dermatology  [facility]    Dermatology Surgery Clinic  [place] [id number]

## 2022-02-24 NOTE — LETTER
2/24/2022       RE: Brenda Carbone  [address]  [place] [address]     Dear Colleague,    Thank you for referring your patient, Brenda Carbone, to the [facility] D[facility]MAT[facility]INIC [place] [facility]. Please see a copy of my visit note below.    [facility] Mohs Surgery Procedure Note    Case #: 1  Date of Service:  Feb 24, 2022  Surgery: Mohs micrographic surgery (MMS)  Staff surgeon: Pavel Birch MD  Fellow surgeon: None  Resident surgeon: None  Nurse: Sonia Smith CMA    Tumor Type: BCC  Location: left temple  Derm-Path Accession #: [id number]       Mohs Accession #:   Pre-Op Size: 1 cm x 1 cm  Final Defect Size: 1.8 cm x 1.8 cm  Number of Mohs stages: 1  Level of Defect: Fat  Local anesthetic: 6 mL 1% lidocaine with epinephrine  Repair Type: Advancement Flap  Repair Size: 2.2 cm2  Suture Material: 4-0 Monocryl; 5-0 fast absorbing gut    Procedure:    Stage I  We discussed the principles of treatment and most likely complications including scarring, bleeding, infection, swelling, pain, crusting, nerve damage, large wound,  incomplete excision, wound dehiscence,  nerve damage, recurrence, and a second procedure may be recommended to obtain the best cosmetic or functional result.    Informed consent was obtained and the patient underwent the procedure as follows:  The patient was placed supine on the operating table.  The cancer was identified, outlined with a marker, and verified by the patient.  The entire surgical field was prepped with chlorhexidine.  The surgical site was anesthetized using lidocaine with epinephrine.    The area of clinically apparent tumor was debulked. The layer of tissue was then surgically excised using a #15 blade and was then transferred onto a specimen sheet maintaining the orientation of the specimen. Hemostasis was obtained using electrocoagulation. The wound site was then covered  with a dressing while the tissue samples were processed for examination.    The excised [facility] maintaining the tissue orientation.  The tissue specimen was relaxed so that the entire surgical margin was in a a single horizontal plane for sectioning and inked for precise mapping.  A precise reference map was drawn to reflect the sectioning of the specimen, colored inking of the margins, and orientation on the patient.  The tissue was processed using horizontal sectioning of the base and continuous peripheral margins.  The histopathologic sections were reviewed in conjunction with the reference map.    Total blocks: 1  Total slides: 2    There were no cancer cells visualized on examination, therefore Mohs surgery was complete.    Reconstruction: Advancement Flap    PROCEDURE:  The wound was debeveled and undermined broadly in all directions to the level of fat. Hemostasis was obtained using electrocoagulation. The advancement flap was incised to the level of fat removing a cone of redundant tissue from laterally. The flap was further undermined in all directions.    Hemostasis was again obtained as above.  The flap was then advanced into the defect and secured using buried dermal sutures.  Redundant areas of tissue were excised using the triangulation technique.  The flap wound edges of both the primary and secondary defects were then approximated with buried dermal sutures, and the epidermis was then carefully approximated using 5-0 fast absorbing gut sutures.  The wound was cleansed with sterile saline, and ointment was applied along the wound surface.  A sterile pressure dressing of non-adherent gauze was applied and wound care instructions were given verbally and in writing. The patient left the operating suite in stable condition. Derma-Abrasion can be used as a second stage of this reconstruction to enhance cosmesis.    Dr. Pavel Birch performed the Mohs  micrographic surgery and reconstruction.    Scribe Disclosure:  I, Norberto Phipps, am serving as a scribe to document services personally performed by Pavel Birch MD based on data collection and the provider's statements to me.       Attending attestation:  I was present for the entirety of the procedure and immediately available for all other portions of the procedure.  I have reviewed the note and edited it as necessary.    Pavel Birch M.D.  Professor  Director of Dermatologic Surgery  Department of Dermatology  [facility]    Dermatology Surgery Clinic  [facility] [place] [id number]

## 2022-08-08 ENCOUNTER — OFFICE VISIT (OUTPATIENT)
Dept: DERMATOLOGY | Facility: CLINIC | Age: 52
End: 2022-08-08
Payer: COMMERCIAL

## 2022-08-08 DIAGNOSIS — L57.0 ACTINIC KERATOSIS: Primary | ICD-10-CM

## 2022-08-08 DIAGNOSIS — Z85.828 HISTORY OF NONMELANOMA SKIN CANCER: ICD-10-CM

## 2022-08-08 DIAGNOSIS — L81.4 SOLAR LENTIGO: ICD-10-CM

## 2022-08-08 PROCEDURE — 99213 OFFICE O/P EST LOW 20 MIN: CPT | Performed by: PHYSICIAN ASSISTANT

## 2022-08-08 ASSESSMENT — PAIN SCALES - GENERAL: PAINLEVEL: NO PAIN (1)

## 2022-08-08 NOTE — NURSING NOTE
Dermatology Rooming Note    Brenda Carbone's goals for this visit include:   Chief Complaint   Patient presents with     Derm Problem     Brenda is here today for a follow up post Efedux creams and mohs      ENRIQUE Zapien

## 2022-08-08 NOTE — PATIENT INSTRUCTIONS
Patient Education     Checking for Skin Cancer  You can find cancer early by checking your skin each month. There are 3 kinds of skin cancer. They are melanoma, basal cell carcinoma, and squamous cell carcinoma. Doing monthly skin checks is the best way to find new marks or skin changes. Follow the instructions below for checking your skin.   The ABCDEs of checking moles for melanoma   Check your moles or growths for signs of melanoma using ABCDE:   Asymmetry: the sides of the mole or growth don t match  Border: the edges are ragged, notched, or blurred  Color: the color within the mole or growth varies  Diameter: the mole or growth is larger than 6 mm (size of a pencil eraser)  Evolving: the size, shape, or color of the mole or growth is changing (evolving is not shown in the images below)    Checking for other types of skin cancer  Basal cell carcinoma or squamous cell carcinoma have symptoms such as:     A spot or mole that looks different from all other marks on your skin  Changes in how an area feels, such as itching, tenderness, or pain  Changes in the skin's surface, such as oozing, bleeding, or scaliness  A sore that does not heal  New swelling or redness beyond the border of a mole    Who s at risk?  Anyone can get skin cancer. But you are at greater risk if you have:   Fair skin, light-colored hair, or light-colored eyes  Many moles or abnormal moles on your skin  A history of sunburns from sunlight or tanning beds  A family history of skin cancer  A history of exposure to radiation or chemicals  A weakened immune system  If you have had skin cancer in the past, you are at risk for recurring skin cancer.   How to check your skin  Do your monthly skin checkups in front of a full-length mirror. Check all parts of your body, including your:   Head (ears, face, neck, and scalp)  Torso (front, back, and sides)  Arms (tops, undersides, upper, and lower armpits)  Hands (palms, backs, and fingers, including  under the nails)  Buttocks and genitals  Legs (front, back, and sides)  Feet (tops, soles, toes, including under the nails, and between toes)  If you have a lot of moles, take digital photos of them each month. Make sure to take photos both up close and from a distance. These can help you see if any moles change over time.   Most skin changes are not cancer. But if you see any changes in your skin, call your doctor right away. Only he or she can diagnose a problem. If you have skin cancer, seeing your doctor can be the first step toward getting the treatment that could save your life.   P[employer] last reviewed this educational content on 4/1/2019 2000-2020 [employer]. [address] [place] [address], [place] [address]. All rights reserved. This information is not intended as a substitute for professional medical care. Always follow your healthcare professional's instructions.       When should I call my doctor?  If you are worsening or not improving, please, contact us or seek urgent care as noted below.     Who should I call with questions (adults)?  [facility] (adult and pediatric): [phone]  [facility] (adult): [phone]  For urgent needs outside of business hours call the [facility] at [phone] and ask for the dermatology resident on call to be paged  If this is a medical emergency and you are unable to reach an ER, Call [phone]    Who should I call with questions (pediatric)?  [facility]- Pediatric Dermatology  Dr. Amelie Horton, Dr. Jazmyne Marshall, Dr. Heidi Kaba, HALEY Griffin, Dr. Kasandra Person, Dr. Chioma Ha & Dr. Eleuterio Devries  Non-urgent nurse triage line; [phone]- Zaira and Jaleesa LOUISE Care Coordinatorrosa Burns (/Complex ) [phone]    If you need a prescription refill, please contact your pharmacy. Refills are approved or denied by our  Physicians during normal business hours, Monday through Fridays  Per office policy, refills will not be granted if you have not been seen within the past year (or sooner depending on your child's condition)    Scheduling Information:  Pediatric Appointment Scheduling and Call Center [phone]  Radiology Scheduling- [phone]  Sedation Unit Scheduling- [phone]  [place] Scheduling- General [phone]; Pediatric Dermatology [phone]  Main  Services: [phone]  Romanian: [phone]  Norwegian: [phone]  Hmong/Comoran/Malay: [phone]  Preadmission Nursing Department Fax Number: [phone] (Fax all pre-operative paperwork to this number)    For urgent matters arising during evenings, weekends, or holidays that cannot wait for normal business hours please call [phone] and ask for the dermatology resident on call to be paged.

## 2022-08-08 NOTE — PROGRESS NOTES
[facility] Dermatology Note  Encounter Date: Aug 8, 2022  Office Visit     Dermatology Problem List:  1. Hx of NMSC  - BCC, left neck s/p biopsy 4/20/10  - SCC, sternum s/p biopsy 8/26/11  - BCC, left preauricular cheek area s/p Mohs 2/24/2022  2. AKs  - s/p cryo  - s/p course of Efudex x 1 month  - Current tx: tretinoin 0.025% cream, calcipotriene 0.005% ointment to the forehead, temples, cheeks, and nose BID for 1 week, self mix equal parts with efudex 5% cream  3. Pigmented AK, central upper chest s/p biopsy 12/13/17  4. Skin cancer screening 11/17/2021    ____________________________________________    Assessment & Plan:    # Actinic keratosis, chest.   Discussed Efudex treatment vs. cryotherapy  - Start calcipotriene 0.005% ointment to the chest BID for 1 week, self mix equal parts with efudex 5% cream on the chest.    # Hx of actinic keratoses on face  - resolved.    # Solar lentigines  - No concerning lesions today  - Monitor for ABCDEs of melanoma   - Continue sun protection - recommend SPF 30 or higher with frequent application   - Return sooner if noticing changing or symptomatic lesions     # History of NMSC. No evidence of recurrent disease.  - Continue yearly skin exams    Procedures Performed:   None    Follow-up: 4-5 months in clinic for FBSE.    Staff and Scribe:     Scribe Disclosure:  I, Norberto Phipps, am serving as a scribe to document services personally performed by Ree Pate PA-C based on data collection and the provider's statements to me.     Provider Disclosure:   The documentation recorded by the scribe accurately reflects the services I personally performed and the decisions made by me.    All risks, benefits and alternatives were discussed with patient.  Patient is in agreement and understands the assessment and plan.  All questions were answered.  Sun Screen Education was given.   Return to Clinic in 4-5 months or sooner as needed.   Ree  Herlinda HU   [facility]   ____________________________________________    CC: Derm Problem (Brenda is here today for a follow up post Efedux creams and mohs )    HPI:  Ms. Brenda Carbone is a(n) 52 year old female who presents today as a return patient for post-Mohs follow-up. Last seen by Dr. Birch on 2/24/22, at which time the patient underwent Mohs for BCC, left preauricular cheek area.     Today, the patient denies any issues to the periauricular site post-Mohs. She also endorses Efudex use to her forehead/cheeks/nose, which caused a robust reaction. She further mentions a recurrent spot to her right upper chest.    Patient is otherwise feeling well, without additional skin concerns.    Labs Reviewed:  N/A    Physical Exam:  Vitals: LMP 10/22/2009   SKIN: Focused examination of right upper chest and face was performed.  - There is an erythematous macule with overyling adherent scale on the right upper chest.   - Scattered brown macules on sun exposed areas.  -No gritty papules to the face.   - No other lesions of concern on areas examined.     Medications:  Current Outpatient Medications   Medication     SUMAtriptan (IMITREX) 100 MG tablet     calcipotriene (DOVONOX) 0.005 % external ointment     fluorouracil (EFUDEX) 5 % external cream     tretinoin (RETIN-A) 0.025 % external cream     No current facility-administered medications for this visit.      Past Medical History:   Patient Active Problem List   Diagnosis     Pain in joint, other specified sites     Adjustment disorder with mixed anxiety and depressed mood     Attention deficit hyperactivity disorder (ADHD)     Benign neoplasm of skin of trunk, except scrotum     AK (actinic keratosis)     Past Medical History:   Diagnosis Date     Actinic keratosis      Basal cell carcinoma      Closed fracture of one or more phalanges of foot 01/13/2000    (R) great toe abstract     Depressive disorder, not elsewhere classified       Hernia of unspecified site of abdominal cavity without mention of obstruction or gangrene 11/09/1998    hx (R) inguinal abstract     Post term pregnancy, unspecified episode of care 01/12/1999    childbirth baby boy abstract     Seborrheic keratosis     (L) breast, benign     Squamous cell carcinoma         CC Phillip Nino MD  [facility]  [address]  [address]  [place] [address] on close of this encounter.

## 2022-08-08 NOTE — LETTER
8/8/2022       RE: Brenda Carbone  [address]  [place] [address]     Dear Colleague,    Thank you for referring your patient, Brenda Carbone, to the [facility] [place] at [facility]. Please see a copy of my visit note below.    [facility] Dermatology Note  Encounter Date: Aug 8, 2022  Office Visit     Dermatology Problem List:  1. Hx of NMSC  - BCC, left neck s/p biopsy 4/20/10  - SCC, sternum s/p biopsy 8/26/11  - BCC, left preauricular cheek area s/p Mohs 2/24/2022  2. AKs  - s/p cryo  - s/p course of Efudex x 1 month  - Current tx: tretinoin 0.025% cream, calcipotriene 0.005% ointment to the forehead, temples, cheeks, and nose BID for 1 week, self mix equal parts with efudex 5% cream  3. Pigmented AK, central upper chest s/p biopsy 12/13/17  4. Skin cancer screening 11/17/2021    ____________________________________________    Assessment & Plan:    # Actinic keratosis, chest.   Discussed Efudex treatment vs. cryotherapy  - Start calcipotriene 0.005% ointment to the chest BID for 1 week, self mix equal parts with efudex 5% cream on the chest.    # Hx of actinic keratoses on face  - resolved.    # Solar lentigines  - No concerning lesions today  - Monitor for ABCDEs of melanoma   - Continue sun protection - recommend SPF 30 or higher with frequent application   - Return sooner if noticing changing or symptomatic lesions     # History of NMSC. No evidence of recurrent disease.  - Continue yearly skin exams    Procedures Performed:   None    Follow-up: 4-5 months in clinic for FBSE.    Staff and Scribe:     Scribe Disclosure:  Norberto WHEATLEY, am serving as a scribe to document services personally performed by Ree Pate PA-C based on data collection and the provider's statements to me.     Provider Disclosure:   The documentation recorded by the scribe accurately reflects the services I  personally performed and the decisions made by me.    All risks, benefits and alternatives were discussed with patient.  Patient is in agreement and understands the assessment and plan.  All questions were answered.  Sun Screen Education was given.   Return to Clinic in 4-5 months or sooner as needed.   Ree Pate PA-C   [facility]   ____________________________________________    CC: Derm Problem (Brenda is here today for a follow up post Efedux creams and mohs )    HPI:  Ms. Brenda Carbone is a(n) 52 year old female who presents today as a return patient for post-Mohs follow-up. Last seen by Dr. Birch on 2/24/22, at which time the patient underwent Mohs for BCC, left preauricular cheek area.     Today, the patient denies any issues to the periauricular site post-Mohs. She also endorses Efudex use to her forehead/cheeks/nose, which caused a robust reaction. She further mentions a recurrent spot to her right upper chest.    Patient is otherwise feeling well, without additional skin concerns.    Labs Reviewed:  N/A    Physical Exam:  Vitals: LMP 10/22/2009   SKIN: Focused examination of right upper chest and face was performed.  - There is an erythematous macule with overyling adherent scale on the right upper chest.   - Scattered brown macules on sun exposed areas.  -No gritty papules to the face.   - No other lesions of concern on areas examined.     Medications:  Current Outpatient Medications   Medication     SUMAtriptan (IMITREX) 100 MG tablet     calcipotriene (DOVONOX) 0.005 % external ointment     fluorouracil (EFUDEX) 5 % external cream     tretinoin (RETIN-A) 0.025 % external cream     No current facility-administered medications for this visit.      Past Medical History:   Patient Active Problem List   Diagnosis     Pain in joint, other specified sites     Adjustment disorder with mixed anxiety and depressed mood     Attention deficit hyperactivity disorder (ADHD)      Benign neoplasm of skin of trunk, except scrotum     AK (actinic keratosis)     Past Medical History:   Diagnosis Date     Actinic keratosis      Basal cell carcinoma      Closed fracture of one or more phalanges of foot 01/13/2000    (R) great toe abstract     Depressive disorder, not elsewhere classified      Hernia of unspecified site of abdominal cavity without mention of obstruction or gangrene 11/09/1998    hx (R) inguinal abstract     Post term pregnancy, unspecified episode of care 01/12/1999    childbirth baby boy abstract     Seborrheic keratosis     (L) breast, benign     Squamous cell carcinoma         CC Phillip Nino MD  [facility]  [address]  [address]  [place] [address] on close of this encounter.

## 2022-11-17 DIAGNOSIS — L57.0 ACTINIC KERATOSIS: ICD-10-CM

## 2022-11-20 ENCOUNTER — HEALTH MAINTENANCE LETTER (OUTPATIENT)
Age: 52
End: 2022-11-20

## 2022-11-22 RX ORDER — CALCIPOTRIENE 50 UG/G
OINTMENT TOPICAL
Qty: 60 G | Refills: 1 | Status: SHIPPED | OUTPATIENT
Start: 2022-11-22 | End: 2023-01-09

## 2022-11-22 NOTE — TELEPHONE ENCOUNTER
CALCIPOTRIENE 0.005% OINTMENT  Last Written Prescription Date:   11/17/2021  Last Fill Quantity: 60,   # refills: 1  Last Office Visit :  8/8/2022  Future Office visit:   1/9/2023    Routing refill request to provider for review/approval because:  Drug not on the [facility], [facility] or [facility] refill protocol or controlled substance      Yessenia Adams RN  Central Triage Red Flags/Med Refills

## 2023-01-03 NOTE — PROGRESS NOTES
[facility] Dermatology Note  Encounter Date: Jan 9, 2023  Office Visit     Dermatology Problem List:  1. Hx of NMSC  - BCC, left neck s/p biopsy 4/20/10  - SCC, sternum s/p biopsy 8/26/11  - BCC, left preauricular cheek area s/p Mohs 2/24/2022  2. AKs  - s/p cryo  - s/p course of Efudex x 1 month  - Current tx: tretinoin 0.025% cream, calcipotriene 0.005% ointment to the forehead, temples, cheeks, and nose BID for 1 week, self mix equal parts with efudex 5% cream  3. Pigmented AK, central upper chest s/p biopsy 12/13/17  4. Skin cancer screening 1/9/2023  # NUB, right posterior upper arm, s/p shave bx 1/9/2023  ____________________________________________    Assessment & Plan:    # NUB, right posterior upper arm, ddx: rule out dysplasia   - Shave biopsy today, see procedure note below    # ISK, left posterior shoulder  # Actinic keratoses, right clavicle, left lower thigh  - Cryotherapy today, see procedure note below    # Multiple benign nevi  # Solar lentigines  - No concerning lesions today  - Monitor for ABCDEs of melanoma   - Continue sun protection - recommend SPF 30 or higher with frequent application   - Return sooner if noticing changing or symptomatic lesions    # Cherry angiomas  # Seborrheic keratoses  - Reassured patient of benign nature, no treatment necessary     # History of NMSC. No evidence of recurrent disease.  - Continue yearly skin exams    Procedures Performed:   - Cryotherapy procedure note, location(s): see above. After verbal consent and discussion of risks and benefits including, but not limited to, dyspigmentation/scar, blister, and pain, 2 lesion(s) was(were) treated with 1-2 mm freeze border for 1-2 cycles with liquid nitrogen. Post cryotherapy instructions were provided.  - Shave biopsy procedure note, location(s): see above. After discussion of benefits and risks including but not limited to bleeding, infection, scar, incomplete removal, recurrence, and  non-diagnostic biopsy, written consent and photographs were obtained. The area was cleaned with isopropyl alcohol. 0.5mL of 1% lidocaine with epinephrine was injected to obtain adequate anesthesia of lesion(s). Shave biopsy at site(s) performed. Hemostasis was achieved with aluminium chloride. Petrolatum ointment and a sterile dressing were applied. The patient tolerated the procedure and no complications were noted. The patient was provided with verbal and written post care instructions.     Follow-up: 1 year(s) in-person, or earlier for new or changing lesions    Staff and Scribe:     Scribe Disclosure:  ITITUS, am serving as a scribe to document services personally performed by Ree Pate PA-C based on data collection and the provider's statements to me.     Provider Disclosure:   The documentation recorded by the scribe accurately reflects the services I personally performed and the decisions made by me.    All risks, benefits and alternatives were discussed with patient.  Patient is in agreement and understands the assessment and plan.  All questions were answered.  Sun Screen Education was given.   Return to Clinic annually or sooner as needed.   Ree Pate PA-C   [facility]ermat[facility]   ____________________________________________    CC: Skin Check (FBSE.  Spot of concern on the chest and back.  Used Efudex on chest, spot went away but came back. )    HPI:  Ms. Brenda Carbone is a(n) 52 year old female who presents today as a return patient for FBSE. Last seen by myself on 8/8/22, at which time the patient was started on Efudex and calcipotriene for treatment of AKs on the chest.    Today, the patient reports that the spots on her chest resolved with Efudex but once has returned. There is mild pain associated with the lesion when she bumps it, but no other changes. She also has a spot on her back that she would like examined.    Patient is otherwise feeling  well, without additional skin concerns.    Labs Reviewed:  N/A    Physical Exam:  Vitals: LMP 10/22/2009   SKIN: Full skin, excluding the groin, which includes the head/face, both arms, chest, back, abdomen,both legs, buttocks, digits and/or nails, was examined.  - 5 mm brown asymmetric macule on the right upper posterior arm.  - There are dome shaped bright red papules on the trunk and extremities.   - Multiple regular brown pigmented macules and papules are identified on the trunk and extremities.   - Scattered brown macules on sun exposed areas.  -There are waxy stuck on tan to brown papules on the trunk and extremities.   - Toenails painted on exam today.  - There is a tan to brown waxy stuck on papule with surrounding erythema on the left posterior shoulder.   - There are erythematous macules with overyling adherent scale on the right clavicle and left lower thigh.   - No other lesions of concern on areas examined.                 Medications:  Current Outpatient Medications   Medication     calcipotriene (DOVONOX) 0.005 % external ointment     SUMAtriptan (IMITREX) 100 MG tablet     tretinoin (RETIN-A) 0.025 % external cream     fluorouracil (EFUDEX) 5 % external cream     No current facility-administered medications for this visit.      Past Medical History:   Patient Active Problem List   Diagnosis     Pain in joint, other specified sites     Adjustment disorder with mixed anxiety and depressed mood     Attention deficit hyperactivity disorder (ADHD)     Benign neoplasm of skin of trunk, except scrotum     AK (actinic keratosis)     Past Medical History:   Diagnosis Date     Actinic keratosis      Basal cell carcinoma      Closed fracture of one or more phalanges of foot 01/13/2000    (R) great toe abstract     Depressive disorder, not elsewhere classified      Hernia of unspecified site of abdominal cavity without mention of obstruction or gangrene 11/09/1998    hx (R) inguinal abstract     Post term  pregnancy, unspecified episode of care 01/12/1999    childbirth baby boy abstract     Seborrheic keratosis     (L) breast, benign     Squamous cell carcinoma         CC Phillip Nino MD  [facility] [place]  [address]  [address]  [place]  [place] [address] on close of this encounter.

## 2023-01-09 ENCOUNTER — OFFICE VISIT (OUTPATIENT)
Dept: DERMATOLOGY | Facility: CLINIC | Age: 53
End: 2023-01-09
Payer: COMMERCIAL

## 2023-01-09 DIAGNOSIS — L82.1 SEBORRHEIC KERATOSIS: ICD-10-CM

## 2023-01-09 DIAGNOSIS — L82.0 INFLAMED SEBORRHEIC KERATOSIS: ICD-10-CM

## 2023-01-09 DIAGNOSIS — Z85.828 HISTORY OF NONMELANOMA SKIN CANCER: Primary | ICD-10-CM

## 2023-01-09 DIAGNOSIS — L81.4 SOLAR LENTIGO: ICD-10-CM

## 2023-01-09 DIAGNOSIS — D49.2 NEOPLASM OF UNSPECIFIED BEHAVIOR OF BONE, SOFT TISSUE, AND SKIN: ICD-10-CM

## 2023-01-09 DIAGNOSIS — D22.9 MULTIPLE BENIGN NEVI: ICD-10-CM

## 2023-01-09 DIAGNOSIS — D18.01 CHERRY ANGIOMA: ICD-10-CM

## 2023-01-09 DIAGNOSIS — L57.0 ACTINIC KERATOSIS: ICD-10-CM

## 2023-01-09 DIAGNOSIS — Z12.83 SKIN CANCER SCREENING: ICD-10-CM

## 2023-01-09 PROCEDURE — 88305 TISSUE EXAM BY PATHOLOGIST: CPT | Mod: 26 | Performed by: PATHOLOGY

## 2023-01-09 PROCEDURE — 88342 IMHCHEM/IMCYTCHM 1ST ANTB: CPT | Mod: TC | Performed by: PHYSICIAN ASSISTANT

## 2023-01-09 PROCEDURE — 11102 TANGNTL BX SKIN SINGLE LES: CPT | Mod: XS | Performed by: PHYSICIAN ASSISTANT

## 2023-01-09 PROCEDURE — 99213 OFFICE O/P EST LOW 20 MIN: CPT | Mod: 25 | Performed by: PHYSICIAN ASSISTANT

## 2023-01-09 PROCEDURE — 17000 DESTRUCT PREMALG LESION: CPT | Mod: XS | Performed by: PHYSICIAN ASSISTANT

## 2023-01-09 PROCEDURE — 17110 DESTRUCTION B9 LES UP TO 14: CPT | Performed by: PHYSICIAN ASSISTANT

## 2023-01-09 PROCEDURE — 88342 IMHCHEM/IMCYTCHM 1ST ANTB: CPT | Mod: 26 | Performed by: PATHOLOGY

## 2023-01-09 PROCEDURE — 17003 DESTRUCT PREMALG LES 2-14: CPT | Mod: XS | Performed by: PHYSICIAN ASSISTANT

## 2023-01-09 ASSESSMENT — PAIN SCALES - GENERAL: PAINLEVEL: NO PAIN (0)

## 2023-01-09 NOTE — PATIENT INSTRUCTIONS
Patient Education     Checking for Skin Cancer  You can find cancer early by checking your skin each month. There are 3 kinds of skin cancer. They are melanoma, basal cell carcinoma, and squamous cell carcinoma. Doing monthly skin checks is the best way to find new marks or skin changes. Follow the instructions below for checking your skin.   The ABCDEs of checking moles for melanoma   Check your moles or growths for signs of melanoma using ABCDE:   Asymmetry: the sides of the mole or growth don t match  Border: the edges are ragged, notched, or blurred  Color: the color within the mole or growth varies  Diameter: the mole or growth is larger than 6 mm (size of a pencil eraser)  Evolving: the size, shape, or color of the mole or growth is changing (evolving is not shown in the images below)    Checking for other types of skin cancer  Basal cell carcinoma or squamous cell carcinoma have symptoms such as:     A spot or mole that looks different from all other marks on your skin  Changes in how an area feels, such as itching, tenderness, or pain  Changes in the skin's surface, such as oozing, bleeding, or scaliness  A sore that does not heal  New swelling or redness beyond the border of a mole    Who s at risk?  Anyone can get skin cancer. But you are at greater risk if you have:   Fair skin, light-colored hair, or light-colored eyes  Many moles or abnormal moles on your skin  A history of sunburns from sunlight or tanning beds  A family history of skin cancer  A history of exposure to radiation or chemicals  A weakened immune system  If you have had skin cancer in the past, you are at risk for recurring skin cancer.   How to check your skin  Do your monthly skin checkups in front of a full-length mirror. Check all parts of your body, including your:   Head (ears, face, neck, and scalp)  Torso (front, back, and sides)  Arms (tops, undersides, upper, and lower armpits)  Hands (palms, backs, and fingers, including  under the nails)  Buttocks and genitals  Legs (front, back, and sides)  Feet (tops, soles, toes, including under the nails, and between toes)  If you have a lot of moles, take digital photos of them each month. Make sure to take photos both up close and from a distance. These can help you see if any moles change over time.   Most skin changes are not cancer. But if you see any changes in your skin, call your doctor right away. Only he or she can diagnose a problem. If you have skin cancer, seeing your doctor can be the first step toward getting the treatment that could save your life.   [employer] last reviewed this educational content on 4/1/2019 2000-2020 [email or web address]. [address] [place] [address], [place] [address]. All rights reserved. This information is not intended as a substitute for professional medical care. Always follow your healthcare professional's instructions.       When should I call my doctor?  If you are worsening or not improving, please, contact us or seek urgent care as noted below.     Who should I call with questions (adults)?  [facility] (adult and pediatric): [phone]  [facility] (adult): [phone]  For urgent needs outside of business hours call the [facility] at [phone] and ask for the dermatology resident on call to be paged  If this is a medical emergency and you are unable to reach an ER, Call [phone]    Who should I call with questions (pediatric)?  [facility]- Pediatric Dermatology  Dr. Amelie Horton, Dr. Jazmyne Marshall, Dr. Heidi Kaba, HALEY Griffin, Dr. Kasandra Person, Dr. Chioma Ha & Dr. Eleuterio Devries  Non-urgent nurse triage line; [phone]- Zaira and Jaleesa LOUISE Care Coordinatorrosa Burns (/Complex ) [phone]    If you need a prescription refill, please contact your pharmacy. Refills are approved or denied by our  Physicians during normal business hours, Monday through Fridays  Per office policy, refills will not be granted if you have not been seen within the past year (or sooner depending on your child's condition)    Scheduling Information:  Pediatric Appointment Scheduling and Call Center [phone]  Radiology Scheduling- [phone]  Sedation Unit Scheduling- [phone]  [place] Scheduling- General [phone]; Pediatric Dermatology [phone]  Main  Services: [phone]  Azeri: [phone]  Samoan: [phone]  Hmong/Ecuadorean/Frisian: [phone]  Preadmission Nursing Department Fax Number: [phone] (Fax all pre-operative paperwork to this number)    For urgent matters arising during evenings, weekends, or holidays that cannot wait for normal business hours please call [phone] and ask for the dermatology resident on call to be paged. Cryotherapy    What is it?  Use of a very cold liquid, such as liquid nitrogen, to freeze and destroy abnormal skin cells that need to be removed    What should I expect?  Tenderness and redness  A small blister that might grow and fill with dark purple blood. There may be crusting.  More than one treatment may be needed if the lesions do not go away.    How do I care for the treated area?  Gently wash the area with your hands when bathing.  Use a thin layer of Vaseline to help with healing. You may use a Band-Aid.   The area should heal within 7-10 days and may leave behind a pink or lighter color.   Do not use an antibiotic or Neosporin ointment.   You may take acetaminophen (Tylenol) for pain.     Call your doctor if you have:  Severe pain  Signs of infection (warmth, redness, cloudy yellow drainage, and or a bad smell)  Questions or concerns    Who should I call with questions?      [facility]: [phone]      [facility]: [phone]      For urgent needs outside of business hours  call the [facility] at [phone] and ask for the dermatology resident on call Wound Care After a Biopsy    What is a skin biopsy?  A skin biopsy allows the doctor to examine a very small piece of tissue under the microscope to determine the diagnosis and the best treatment for the skin condition. A local anesthetic (numbing medicine)  is injected with a very small needle into the skin area to be tested. A small piece of skin is taken from the area. Sometimes a suture (stitch) is used.     What are the risks of a skin biopsy?  I will experience scar, bleeding, swelling, pain, crusting and redness. I may experience incomplete removal or recurrence. Risks of this procedure are excessive bleeding, bruising, infection, nerve damage, numbness, thick (hypertrophic or keloidal) scar and non-diagnostic biopsy.    How should I care for my wound for the first 24 hours?  Keep the wound dry and covered for 24 hours  If it bleeds, hold direct pressure on the area for 15 minutes. If bleeding does not stop then go to the emergency room  Avoid strenuous exercise the first 1-2 days or as your doctor instructs you    How should I care for the wound after 24 hours?  After 24 hours, remove the bandage  You may bathe or shower as normal  If you had a scalp biopsy, you can shampoo as usual and can use shower water to clean the biopsy site daily  Clean the wound twice a day with gentle soap and water  Do not scrub, be gentle  Apply white petroleum/Vaseline after cleaning the wound with a cotton swab or a clean finger, and keep the site covered with a Bandaid /bandage. Bandages are not necessary with a scalp biopsy  If you are unable to cover the site with a Bandaid /bandage, re-apply ointment 2-3 times a day to keep the site moist. Moisture will help with healing  Avoid strenuous activity for first 1-2 days  Avoid lakes, rivers, pools, and oceans until the stitches are removed or the site is healed    How do I clean my wound?  Wash  hands thoroughly with soap or use hand  before all wound care  Clean the wound with gentle soap and water  Apply white petroleum/Vaseline  to wound after it is clean  Replace the Bandaid /bandage to keep the wound covered for the first few days or as instructed by your doctor  If you had a scalp biopsy, warm shower water to the area on a daily basis should suffice    What should I use to clean my wound?   Cotton-tipped applicators (Qtips )  White petroleum jelly (Vaseline ). Use a clean new container and use Q-tips to apply.  Bandaids   as needed  Gentle soap     How should I care for my wound long term?  Do not get your wound dirty  Keep up with wound care for one week or until the area is healed.  A small scab will form and fall off by itself when the area is completely healed. The area will be red and will become pink in color as it heals. Sun protection is very important for how your scar will turn out. Sunscreen with an SPF 30 or greater is recommended once the area is healed.  If you have stitches, stitches need to be removed in 14 days. You may return to our clinic for this or you may have it done locally at your doctor s office.  You should have some soreness but it should be mild and slowly go away over several days. Talk to your doctor about using tylenol for pain,    When should I call my doctor?  If you have increased:   Pain or swelling  Pus or drainage (clear or slightly yellow drainage is ok)  Temperature over 100F  Spreading redness or warmth around wound    When will I hear about my results?  The biopsy results can take 2 weeks to come back.  Your results will automatically release to [employer] before your provider has even reviewed them.  The clinic will call you with the results, send you a Relay message, or have you schedule a follow-up clinic or phone time to discuss the results.  Contact our clinics if you do not hear from us in 2 weeks.    Who should I call with questions?  [place]  [facility]: [phone]  [facility]: [phone]  For urgent needs outside of business hours call the [facility] at [phone] and ask for the dermatology resident on call

## 2023-01-09 NOTE — NURSING NOTE
Lidocaine-epinephrine 1-1:655049 % injection   0.5mL once for one use, starting 1/9/2023 ending 1/9/2023,  2mL disp, R-0, injection  Injected by Elyse Dawn CMA

## 2023-01-09 NOTE — NURSING NOTE
Dermatology Rooming Note    Brenda Carbone's goals for this visit include:   Chief Complaint   Patient presents with     Skin Check     FBSE.  Spot of concern on the chest and back.  Used Efudex on chest, spot went away but came back.      Elyse Dawn, CMA

## 2023-01-09 NOTE — LETTER
1/9/2023       RE: Brenda Carbone  [address]  [place] [address]     Dear Colleague,    Thank you for referring your patient, Brenda Carbone, to the [facility] [place] [facility]. Please see a copy of my visit note below.    [facility] Dermatology Note  Encounter Date: Jan 9, 2023  Office Visit     Dermatology Problem List:  1. Hx of NMSC  - BCC, left neck s/p biopsy 4/20/10  - SCC, sternum s/p biopsy 8/26/11  - BCC, left preauricular cheek area s/p Mohs 2/24/2022  2. AKs  - s/p cryo  - s/p course of Efudex x 1 month  - Current tx: tretinoin 0.025% cream, calcipotriene 0.005% ointment to the forehead, temples, cheeks, and nose BID for 1 week, self mix equal parts with efudex 5% cream  3. Pigmented AK, central upper chest s/p biopsy 12/13/17  4. Skin cancer screening 1/9/2023  # NUB, right posterior upper arm, s/p shave bx 1/9/2023  ____________________________________________    Assessment & Plan:    # NUB, right posterior upper arm, ddx: rule out dysplasia   - Shave biopsy today, see procedure note below    # ISK, left posterior shoulder  # Actinic keratoses, right clavicle, left lower thigh  - Cryotherapy today, see procedure note below    # Multiple benign nevi  # Solar lentigines  - No concerning lesions today  - Monitor for ABCDEs of melanoma   - Continue sun protection - recommend SPF 30 or higher with frequent application   - Return sooner if noticing changing or symptomatic lesions    # Cherry angiomas  # Seborrheic keratoses  - Reassured patient of benign nature, no treatment necessary     # History of NMSC. No evidence of recurrent disease.  - Continue yearly skin exams    Procedures Performed:   - Cryotherapy procedure note, location(s): see above. After verbal consent and discussion of risks and benefits including, but not limited to, dyspigmentation/scar, blister, and pain, 2 lesion(s) was(were)  treated with 1-2 mm freeze border for 1-2 cycles with liquid nitrogen. Post cryotherapy instructions were provided.  - Shave biopsy procedure note, location(s): see above. After discussion of benefits and risks including but not limited to bleeding, infection, scar, incomplete removal, recurrence, and non-diagnostic biopsy, written consent and photographs were obtained. The area was cleaned with isopropyl alcohol. 0.5mL of 1% lidocaine with epinephrine was injected to obtain adequate anesthesia of lesion(s). Shave biopsy at site(s) performed. Hemostasis was achieved with aluminium chloride. Petrolatum ointment and a sterile dressing were applied. The patient tolerated the procedure and no complications were noted. The patient was provided with verbal and written post care instructions.     Follow-up: 1 year(s) in-person, or earlier for new or changing lesions    Staff and Scribe:     Scribe Disclosure:  TITUS WHEATLEY, am serving as a scribe to document services personally performed by Ree Pate PA-C based on data collection and the provider's statements to me.     Provider Disclosure:   The documentation recorded by the scribe accurately reflects the services I personally performed and the decisions made by me.    All risks, benefits and alternatives were discussed with patient.  Patient is in agreement and understands the assessment and plan.  All questions were answered.  Sun Screen Education was given.   Return to Clinic annually or sooner as needed.   Ree Pate PA-C   [facility]   ____________________________________________    CC: Skin Check (FBSE.  Spot of concern on the chest and back.  Used Efudex on chest, spot went away but came back. )    HPI:  Ms. Brenda Carbone is a(n) 52 year old female who presents today as a return patient for FBSE. Last seen by myself on 8/8/22, at which time the patient was started on Efudex and calcipotriene for treatment of  AKs on the chest.    Today, the patient reports that the spots on her chest resolved with Efudex but once has returned. There is mild pain associated with the lesion when she bumps it, but no other changes. She also has a spot on her back that she would like examined.    Patient is otherwise feeling well, without additional skin concerns.    Labs Reviewed:  N/A    Physical Exam:  Vitals: LMP 10/22/2009   SKIN: Full skin, excluding the groin, which includes the head/face, both arms, chest, back, abdomen,both legs, buttocks, digits and/or nails, was examined.  - 5 mm brown asymmetric macule on the right upper posterior arm.  - There are dome shaped bright red papules on the trunk and extremities.   - Multiple regular brown pigmented macules and papules are identified on the trunk and extremities.   - Scattered brown macules on sun exposed areas.  -There are waxy stuck on tan to brown papules on the trunk and extremities.   - Toenails painted on exam today.  - There is a tan to brown waxy stuck on papule with surrounding erythema on the left posterior shoulder.   - There are erythematous macules with overyling adherent scale on the right clavicle and left lower thigh.   - No other lesions of concern on areas examined.                 Medications:  Current Outpatient Medications   Medication     calcipotriene (DOVONOX) 0.005 % external ointment     SUMAtriptan (IMITREX) 100 MG tablet     tretinoin (RETIN-A) 0.025 % external cream     fluorouracil (EFUDEX) 5 % external cream     No current facility-administered medications for this visit.      Past Medical History:   Patient Active Problem List   Diagnosis     Pain in joint, other specified sites     Adjustment disorder with mixed anxiety and depressed mood     Attention deficit hyperactivity disorder (ADHD)     Benign neoplasm of skin of trunk, except scrotum     AK (actinic keratosis)     Past Medical History:   Diagnosis Date     Actinic keratosis      Basal cell  carcinoma      Closed fracture of one or more phalanges of foot 01/13/2000    (R) great toe abstract     Depressive disorder, not elsewhere classified      Hernia of unspecified site of abdominal cavity without mention of obstruction or gangrene 11/09/1998    hx (R) inguinal abstract     Post term pregnancy, unspecified episode of care 01/12/1999    childbirth baby boy abstract     Seborrheic keratosis     (L) breast, benign     Squamous cell carcinoma         CC Phillip Nino MD  [facility]  [address]  [address]  [place] [address] on close of this encounter.

## 2023-01-11 LAB
PATH REPORT.COMMENTS IMP SPEC: NORMAL
PATH REPORT.COMMENTS IMP SPEC: NORMAL
PATH REPORT.FINAL DX SPEC: NORMAL
PATH REPORT.GROSS SPEC: NORMAL
PATH REPORT.MICROSCOPIC SPEC OTHER STN: NORMAL
PATH REPORT.RELEVANT HX SPEC: NORMAL

## 2023-02-27 ENCOUNTER — OFFICE VISIT (OUTPATIENT)
Dept: DERMATOLOGY | Facility: CLINIC | Age: 53
End: 2023-02-27
Payer: COMMERCIAL

## 2023-02-27 DIAGNOSIS — D09.9 SQUAMOUS CELL CARCINOMA IN SITU (SCCIS): Primary | ICD-10-CM

## 2023-02-27 PROCEDURE — 17261 DSTRJ MAL LES T/A/L .6-1.0CM: CPT | Performed by: PHYSICIAN ASSISTANT

## 2023-02-27 ASSESSMENT — PAIN SCALES - GENERAL: PAINLEVEL: NO PAIN (0)

## 2023-02-27 NOTE — NURSING NOTE
Dermatology Rooming Note    Brenda Carbone's goals for this visit include:   Chief Complaint   Patient presents with     Derm Problem     ED&C right upper arm     Valerie Riley LPN

## 2023-02-27 NOTE — LETTER
2/27/2023       RE: Brenda Carbone  [address]  [place] [address]     Dear Colleague,    Thank you for referring your patient, Brenda Carbone, to the [facility] [place] [facility]. Please see a copy of my visit note below.    Dermatology Procedure Note: Electrodesiccation and Curettage    Dermatology Problem List:  1. Hx of NMSC  - BCC, left neck s/p biopsy 4/20/10  - SCC, sternum s/p biopsy 8/26/11  - BCC, left preauricular cheek area s/p Mohs 2/24/2022  - SCCis, right posterior upper arm, s/p ED&C 1/9/23  2. AKs  - s/p cryo  - s/p course of Efudex x 1 month  - Current tx: tretinoin 0.025% cream, calcipotriene 0.005% ointment to the forehead, temples, cheeks, and nose BID for 1 week, self mix equal parts with efudex 5% cream  3. Pigmented AK, central upper chest s/p biopsy 12/13/17  4. Skin cancer screening 1/9/2023    PREOPERATIVE DIAGNOSIS: SCCis    POSTOPERATIVE DIAGNOSIS: same    LOCATION: right upper posterior arm    SIZE: 7 x 7 mm    Treatment options including electrodessiccation and curettage (ED and C), excision and topicals were reviewed.  The expected cure rates, healing times and anticipated scars of each option were discussed and the patient elects to proceed with ED and C.     The risks and benefits of the procedure were described to the patient.  These include but are not limited to bleeding, infection, scar, incomplete removal, and recurrence. Written informed consent was obtained. Time-out was performed. The above site was cleansed with and injected with 2 mL 1% lidocaine with epinephrine. Once anesthesia was obtained, the site was prepped with Alcohol. The lesion was curetted with in 3 directions with a 3 mm margin and this was followed by electrodessication.  This process was repeated three times. The defect measured 1.0 x 1.0 cm. Vaseline and a bandage were applied to the wound. The patient tolerated the  procedure well and was given post care instructions.    Clinical Follow-up: FBSE in 4 months     Staff Involved:  Scribe/Staff    Scribe Disclosure:  I, TITUS VICKI, am serving as a scribe to document services personally performed by Ree Pate PA-C based on data collection and the provider's statements to me.     Provider Disclosure:   The documentation recorded by the scribe accurately reflects the services I personally performed and the decisions made by me.    All risks, benefits and alternatives were discussed with patient.  Patient is in agreement and understands the assessment and plan.  All questions were answered.  Sun Screen Education was given.   Return to Clinic in 4 months or sooner as needed.   Ree Pate PA-C   [facility]

## 2023-02-27 NOTE — PATIENT INSTRUCTIONS
Wound Care:  Electrodesiccation and Curettage     I will experience scar, altered skin color, bleeding, swelling, pain, crusting and redness. I may experience altered sensation. Risks are excessive bleeding, infection, muscle weakness, thick (hypertrophic or keloidal) scar, and recurrence,. A second procedure may be recommended to obtain the best cosmetic or functional result.    What is electrodesiccation and curettage ?  Scraping off tissue (curettage)  Destroy tissue using electric current or cautery (electrodessication)    How do I perform wound care?  Keep dressing in place for two days. You may shower with the dressing in place(do not get wet)  After 2 days, wash hands and remove dressing. Clean wound with cotton-swab soaked in hydrogen peroxide to remove drainage and crust  Put on a thick layer of Vaseline on the wound using a cotton-swab   Cover the wound with a Band-AidTM to protect from dust and tight clothing  If wound is draining before two days, change your dressing as described above sooner  During wound care, do not allow the area to dry out or form a scab    What do I need?  Hydrogen peroxide   Cotton-swabs   Vaseline or petroleum jelly   Band-AidsTM or dressing supplies as needed     When should I call the doctor?  [facility]: [phone]  [facility]: [phone]  For urgent needs outside of business hours call the [facility] at [phone] and ask for the dermatology resident on call

## 2023-02-27 NOTE — PROGRESS NOTES
Dermatology Procedure Note: Electrodesiccation and Curettage    Dermatology Problem List:  1. Hx of NMSC  - BCC, left neck s/p biopsy 4/20/10  - SCC, sternum s/p biopsy 8/26/11  - BCC, left preauricular cheek area s/p Mohs 2/24/2022  - SCCis, right posterior upper arm, s/p ED&C 1/9/23  2. AKs  - s/p cryo  - s/p course of Efudex x 1 month  - Current tx: tretinoin 0.025% cream, calcipotriene 0.005% ointment to the forehead, temples, cheeks, and nose BID for 1 week, self mix equal parts with efudex 5% cream  3. Pigmented AK, central upper chest s/p biopsy 12/13/17  4. Skin cancer screening 1/9/2023    PREOPERATIVE DIAGNOSIS: SCCis    POSTOPERATIVE DIAGNOSIS: same    LOCATION: right upper posterior arm    SIZE: 7 x 7 mm    Treatment options including electrodessiccation and curettage (ED and C), excision and topicals were reviewed.  The expected cure rates, healing times and anticipated scars of each option were discussed and the patient elects to proceed with ED and C.     The risks and benefits of the procedure were described to the patient.  These include but are not limited to bleeding, infection, scar, incomplete removal, and recurrence. Written informed consent was obtained. Time-out was performed. The above site was cleansed with and injected with 2 mL 1% lidocaine with epinephrine. Once anesthesia was obtained, the site was prepped with Alcohol. The lesion was curetted with in 3 directions with a 3 mm margin and this was followed by electrodessication.  This process was repeated three times. The defect measured 1.0 x 1.0 cm. Vaseline and a bandage were applied to the wound. The patient tolerated the procedure well and was given post care instructions.    Clinical Follow-up: FBSE in 4 months     Staff Involved:  Scribe/Staff    Scribe Disclosure:  TITUS WHEATLEY, am serving as a scribe to document services personally performed by Ree Pate PA-C based on data collection and the provider's  statements to me.     Provider Disclosure:   The documentation recorded by the scribe accurately reflects the services I personally performed and the decisions made by me.    All risks, benefits and alternatives were discussed with patient.  Patient is in agreement and understands the assessment and plan.  All questions were answered.  Sun Screen Education was given.   Return to Clinic in 4 months or sooner as needed.   Ree Pate PA-C   [facility] Dermat[facility]

## 2023-02-27 NOTE — NURSING NOTE
Lidocaine-epinephrine 1-1:328696 % injection   1.5mL once for one use, starting 2/27/2023 ending 2/27/2023,  2mL disp, R-0, injection  Injected by Elyse Dawn CMA

## 2023-06-01 ENCOUNTER — HEALTH MAINTENANCE LETTER (OUTPATIENT)
Age: 53
End: 2023-06-01

## 2023-09-10 ENCOUNTER — HEALTH MAINTENANCE LETTER (OUTPATIENT)
Age: 53
End: 2023-09-10

## 2023-10-09 ENCOUNTER — OFFICE VISIT (OUTPATIENT)
Dept: DERMATOLOGY | Facility: CLINIC | Age: 53
End: 2023-10-09
Payer: COMMERCIAL

## 2023-10-09 DIAGNOSIS — L90.5 SCAR IRRITATION: ICD-10-CM

## 2023-10-09 DIAGNOSIS — L57.0 ACTINIC KERATOSIS: Primary | ICD-10-CM

## 2023-10-09 PROCEDURE — 99213 OFFICE O/P EST LOW 20 MIN: CPT | Performed by: PHYSICIAN ASSISTANT

## 2023-10-09 ASSESSMENT — PAIN SCALES - GENERAL: PAINLEVEL: NO PAIN (0)

## 2023-10-09 NOTE — PROGRESS NOTES
[facility] Dermatology Note  Encounter Date: Oct 9, 2023  Office Visit     Dermatology Problem List:  1. Hx of NMSC  - BCC, left neck s/p biopsy 4/20/10  - SCC, sternum s/p biopsy 8/26/11  - BCC, left preauricular cheek area s/p Mohs 2/24/2022  - SCCis, right posterior upper arm, s/p ED&C 1/9/23  2. AKs  - s/p cryo  - s/p course of Efudex x 1 month  - Current tx: tretinoin 0.025% cream, calcipotriene 0.005% ointment to the forehead, temples, cheeks, and nose BID for 1 week, self mix equal parts with efudex 5% cream  3. Pigmented AK, central upper chest s/p biopsy 12/13/17  4. Skin cancer screening 1/9/2023  5. SCCs, right upper posterior arm s/p electrodessication and curretage,    ____________________________________________    Assessment & Plan:     # Actinic keratoses, chest. (Okay to use on the face as well)  - Start calcipotriene 0.005% ointment bid mixed with equal parts Efudex 5% cream twice daily for 7 days.   - Discussed possible side effects of irritation, burning or pruritus      # Irritated scar, chest, new acute.    - Use hydrocortisone OTC as needed. She defers any prescription treatment.      Procedures Performed:   None    Follow-up: 6 month(s) in-person for a FBSE, or earlier for new or changing lesions    Staff and Scribe:     I, Rikki Roldan, am serving as a scribe to document services personally performed by Ree Pate PA-C based on data collection and the provider's statements to me.    Provider Disclosure:   The documentation recorded by the scribe accurately reflects the services I personally performed and the decisions made by me.    All risks, benefits and alternatives were discussed with patient.  Patient is in agreement and understands the assessment and plan.  All questions were answered.  Sun Screen Education was given.   Return to Clinic in 6 months or sooner as needed.   Ree Pate PA-C   [facility]       ____________________________________________    CC: Derm Problem (Patient is here today for lesions of concern. )    HPI:  Ms. Brenda Carbone is a(n) 53 year old female who presents today as a return patient for lesions of concern. Patient was last seen on 2/27/23 for electrodesiccation and curettage of SCCs on the right upper posterior arm. Today, she has concerns regarding itchy spots on her chest.     Patient is otherwise feeling well, without additional skin concerns.     Labs Reviewed:  N/A    Physical Exam:  Vitals: LMP 10/22/2009   SKIN: Focused examination of chest, face and right upper posterior arm was performed.  - Slightly thickened arm on the right upper posterior arm without erythema or scale.   - There are erythematous macules  x 2 with overyling adherent scale on the chest..   - No other lesions of concern on areas examined.     Medications:  Current Outpatient Medications   Medication    SUMAtriptan (IMITREX) 100 MG tablet    tretinoin (RETIN-A) 0.025 % external cream     No current facility-administered medications for this visit.      Past Medical History:   Patient Active Problem List   Diagnosis    Pain in joint, other specified sites    Adjustment disorder with mixed anxiety and depressed mood    Attention deficit hyperactivity disorder (ADHD)    Benign neoplasm of skin of trunk, except scrotum    AK (actinic keratosis)     Past Medical History:   Diagnosis Date    Actinic keratosis     Basal cell carcinoma     Closed fracture of one or more phalanges of foot 01/13/2000    (R) great toe abstract    Depressive disorder, not elsewhere classified     Hernia of unspecified site of abdominal cavity without mention of obstruction or gangrene 11/09/1998    hx (R) inguinal abstract    Post term pregnancy, unspecified episode of care 01/12/1999    childbirth baby boy abstract    Seborrheic keratosis     (L) breast, benign    Squamous cell carcinoma        CC No referring provider defined for this  encounter. on close of this encounter.

## 2023-10-09 NOTE — PATIENT INSTRUCTIONS
Start field therapy with topical Chemotherapy.   Start calcipotriene 0.005% ointment bid  mixed with equal parts Efudex 5% cream twice daily for seven days. Discussed possible side effects of irritation, burning or pruritus.   Teratogenic.

## 2023-10-09 NOTE — NURSING NOTE
Chief Complaint   Patient presents with    Derm Problem     Patient is here today for lesions of concern.      .atd

## 2023-10-09 NOTE — LETTER
10/9/2023       RE: Brenda Carbone  [address]  [place] [address]     Dear Colleague,    Thank you for referring your patient, Brenda Carbone, to the [facility] DERMATOLOGY [facility] [place] at [facility]. Please see a copy of my visit note below.    [facility] Dermatology Note  Encounter Date: Oct 9, 2023  Office Visit     Dermatology Problem List:  1. Hx of NMSC  - BCC, left neck s/p biopsy 4/20/10  - SCC, sternum s/p biopsy 8/26/11  - BCC, left preauricular cheek area s/p Mohs 2/24/2022  - SCCis, right posterior upper arm, s/p ED&C 1/9/23  2. AKs  - s/p cryo  - s/p course of Efudex x 1 month  - Current tx: tretinoin 0.025% cream, calcipotriene 0.005% ointment to the forehead, temples, cheeks, and nose BID for 1 week, self mix equal parts with efudex 5% cream  3. Pigmented AK, central upper chest s/p biopsy 12/13/17  4. Skin cancer screening 1/9/2023  5. SCCs, right upper posterior arm s/p electrodessication and curretage,    ____________________________________________    Assessment & Plan:     # Actinic keratoses, chest. (Okay to use on the face as well)  - Start calcipotriene 0.005% ointment bid mixed with equal parts Efudex 5% cream twice daily for 7 days.   - Discussed possible side effects of irritation, burning or pruritus      # Irritated scar, chest, new acute.    - Use hydrocortisone OTC as needed. She defers any prescription treatment.      Procedures Performed:   None    Follow-up: 6 month(s) in-person for a FBSE, or earlier for new or changing lesions    Staff and Scribe:     Rikki WHEATLEY, am serving as a scribe to document services personally performed by Ree Pate PA-C based on data collection and the provider's statements to me.    Provider Disclosure:   The documentation recorded by the scribe accurately reflects the services I personally performed and the decisions made by me.    All risks,  benefits and alternatives were discussed with patient.  Patient is in agreement and understands the assessment and plan.  All questions were answered.  Sun Screen Education was given.   Return to Clinic in 6 months or sooner as needed.   Ree Pate PA-C   [facility]      ____________________________________________    CC: Derm Problem (Patient is here today for lesions of concern. )    HPI:  Ms. Brenda Carbone is a(n) 53 year old female who presents today as a return patient for lesions of concern. Patient was last seen on 2/27/23 for electrodesiccation and curettage of SCCs on the right upper posterior arm. Today, she has concerns regarding itchy spots on her chest.     Patient is otherwise feeling well, without additional skin concerns.     Labs Reviewed:  N/A    Physical Exam:  Vitals: LMP 10/22/2009   SKIN: Focused examination of chest, face and right upper posterior arm was performed.  - Slightly thickened arm on the right upper posterior arm without erythema or scale.   - There are erythematous macules  x 2 with overyling adherent scale on the chest..   - No other lesions of concern on areas examined.     Medications:  Current Outpatient Medications   Medication    SUMAtriptan (IMITREX) 100 MG tablet    tretinoin (RETIN-A) 0.025 % external cream     No current facility-administered medications for this visit.      Past Medical History:   Patient Active Problem List   Diagnosis    Pain in joint, other specified sites    Adjustment disorder with mixed anxiety and depressed mood    Attention deficit hyperactivity disorder (ADHD)    Benign neoplasm of skin of trunk, except scrotum    AK (actinic keratosis)     Past Medical History:   Diagnosis Date    Actinic keratosis     Basal cell carcinoma     Closed fracture of one or more phalanges of foot 01/13/2000    (R) great toe abstract    Depressive disorder, not elsewhere classified     Hernia of unspecified site of abdominal  cavity without mention of obstruction or gangrene 11/09/1998    hx (R) inguinal abstract    Post term pregnancy, unspecified episode of care 01/12/1999    childbirth baby boy abstract    Seborrheic keratosis     (L) breast, benign    Squamous cell carcinoma        CC No referring provider defined for this encounter. on close of this encounter.

## 2023-11-03 ENCOUNTER — MYC MEDICAL ADVICE (OUTPATIENT)
Dept: DERMATOLOGY | Facility: CLINIC | Age: 53
End: 2023-11-03
Payer: COMMERCIAL

## 2023-11-03 DIAGNOSIS — L57.0 ACTINIC KERATOSIS: ICD-10-CM

## 2023-11-03 NOTE — TELEPHONE ENCOUNTER
Last OV: 10/9    # Actinic keratoses, chest. (Okay to use on the face as well)  - Start calcipotriene 0.005% ointment bid mixed with equal parts Efudex 5% cream twice daily for 7 days.   - Discussed possible side effects of irritation, burning or pruritus

## 2023-11-06 RX ORDER — CALCIPOTRIENE 50 UG/G
OINTMENT TOPICAL
Qty: 60 G | Refills: 1 | Status: SHIPPED | OUTPATIENT
Start: 2023-11-06

## 2023-11-06 RX ORDER — FLUOROURACIL 50 MG/G
CREAM TOPICAL 2 TIMES DAILY
Qty: 40 G | Refills: 1 | Status: SHIPPED | OUTPATIENT
Start: 2023-11-06

## 2024-03-08 ENCOUNTER — MYC MEDICAL ADVICE (OUTPATIENT)
Dept: DERMATOLOGY | Facility: CLINIC | Age: 54
End: 2024-03-08
Payer: COMMERCIAL

## 2024-03-08 DIAGNOSIS — L24.9 IRRITANT DERMATITIS: Primary | ICD-10-CM

## 2024-03-08 RX ORDER — TRIAMCINOLONE ACETONIDE 1 MG/G
OINTMENT TOPICAL 2 TIMES DAILY
Qty: 60 G | Refills: 0 | Status: SHIPPED | OUTPATIENT
Start: 2024-03-08

## 2024-04-24 ENCOUNTER — OFFICE VISIT (OUTPATIENT)
Dept: DERMATOLOGY | Facility: CLINIC | Age: 54
End: 2024-04-24
Payer: COMMERCIAL

## 2024-04-24 DIAGNOSIS — D22.9 MULTIPLE BENIGN NEVI: ICD-10-CM

## 2024-04-24 DIAGNOSIS — D18.01 CHERRY ANGIOMA: ICD-10-CM

## 2024-04-24 DIAGNOSIS — Z12.83 SKIN CANCER SCREENING: Primary | ICD-10-CM

## 2024-04-24 DIAGNOSIS — L81.4 SOLAR LENTIGO: ICD-10-CM

## 2024-04-24 DIAGNOSIS — Z85.828 HISTORY OF NONMELANOMA SKIN CANCER: ICD-10-CM

## 2024-04-24 DIAGNOSIS — L57.0 ACTINIC KERATOSIS: ICD-10-CM

## 2024-04-24 DIAGNOSIS — B07.8 COMMON WART: ICD-10-CM

## 2024-04-24 DIAGNOSIS — L82.1 SEBORRHEIC KERATOSES: ICD-10-CM

## 2024-04-24 PROCEDURE — 17003 DESTRUCT PREMALG LES 2-14: CPT | Mod: XS | Performed by: PHYSICIAN ASSISTANT

## 2024-04-24 PROCEDURE — 17000 DESTRUCT PREMALG LESION: CPT | Mod: XS | Performed by: PHYSICIAN ASSISTANT

## 2024-04-24 PROCEDURE — 99213 OFFICE O/P EST LOW 20 MIN: CPT | Mod: 25 | Performed by: PHYSICIAN ASSISTANT

## 2024-04-24 PROCEDURE — 17110 DESTRUCTION B9 LES UP TO 14: CPT | Performed by: PHYSICIAN ASSISTANT

## 2024-04-24 ASSESSMENT — PAIN SCALES - GENERAL: PAINLEVEL: NO PAIN (0)

## 2024-04-24 NOTE — NURSING NOTE
Dermatology Rooming Note    Brenda Oscar's goals for this visit include:   Chief Complaint   Patient presents with    Skin Check     Annual FBSE.  Spots of concern on the face    Derm Problem     Wart on the left pointer finger      Elyse Dawn CMA

## 2024-04-24 NOTE — LETTER
4/24/2024       RE: Brenda Oscar  [address]  [place] [facility] [address]     Dear Colleague,    Thank you for referring your patient, Brenda Oscar, to the [facility] [place] at [facility]. Please see a copy of my visit note below.    [facility] Dermatology Note  Encounter Date: Apr 24, 2024  Office Visit     Dermatology Problem List:  1. Hx of NMSC  - BCC, left neck s/p biopsy 4/20/10  - SCC, sternum s/p biopsy 8/26/11  - BCC, left preauricular cheek area s/p Mohs 2/24/2022  - SCCis, right posterior upper arm, s/p ED&C 1/9/23  2. AKs  - s/p cryo  - s/p course of Efudex x 1 month  - Current tx: tretinoin 0.025% cream, calcipotriene 0.005% ointment to the forehead, temples, cheeks, and nose BID for 1 week, self mix equal parts with efudex 5% cream  3. Pigmented AK, central upper chest s/p biopsy 12/13/17  4. Verruca  - s/p cryotherapy  Skin cancer screening 4/24/24        ____________________________________________    Assessment & Plan:     # Actinic keratoses, left chest/ breast x 4 and left shin  - cryotherapy performed today, see note below    # Hx of actinic keratoses,   - continue calcipotriene 0.005% ointment bid mixed with equal parts Efudex 5% cream twice daily for 7 days to the face every 6 months to a year, on the face.   - Discussed possible side effects of irritation, burning or pruritus      # Common wart, left index finger x 1  - cryotherapy performed today, see note below    # Skin cancer screening with multiple benign nevi, solar lentigines  - ABCDEs: Counseled ABCDEs of melanoma: Asymmetry, Border (irregularity), Color (not uniform, changes in color), Diameter (greater than 6 mm which is about the size of a pencil eraser), and Evolving (any changes in preexisting moles).  - Sun protection: Counseled SPF30+ sunscreen, UPF clothing, sun avoidance, tanning bed avoidance.    # Cherry angiomas and  seborrheic keratoses,  Benign, reassurance given.       # Hx NMSC.  -No signs of recurrence  - Continue regular skin examinations  - Sun precaution was advised including the use of sun screens of SPF 30 or higher, sun protective clothing, and avoidance of tanning beds.       Procedures Performed:   Cryotherapy procedure note: After verbal consent and discussion of risks and benefits including but no limited to dyspigmentation/scar, blister, and pain, 1 verrucous papule and 5 AK lesions was(were) treated with 1-2mm freeze border for 2 cycles with liquid nitrogen. Post cryotherapy instructions were provided.       Follow-up: 2-3 week(s) in-person, if verruca is still present. Otherwise, 1 year(s) in- person, or earlier for new or changing lesions    Staff and Scribe:   Scribe Disclosure:   By signing my name below, I, Malika Wills, attest that this documentation has been prepared under the direction and in the presence of Ree Pate PA-C.  - Electronically Signed: Malika Wills 04/24/24       Provider Disclosure:  I agree with above History, Review of Systems, Physical exam and Plan.  I have reviewed the content of the documentation and have edited it as needed. I have personally performed the services documented here and the documentation accurately represents those services and the decisions I have made.      Electronically signed by:  All risk, benefits and alternatives were discussed with patient.  Patient is in agreement and understands the assessment and plan.  All questions were answered.  Sun Screen Education was given.   Return to Clinic annually or sooner as needed.   Ree Pate PA-C        ____________________________________________    CC: Skin Check (Annual FBSE.  Spots of concern on the face) and Derm Problem (Wart on the left pointer finger )    HPI:  Ms. Brenda Carbone is a(n) 54 year old female who presents today as a return patient for a FBSC. Patient was seen on 2/27/23 for  electrodesiccation and curettage of SCCs on the right upper posterior arm. She was last seen by me on 10/09/2023.    Patient reports she feels more rejuvenated and still uses her topicals.    Patient is otherwise feeling well, without additional skin concerns.     Labs Reviewed:  N/A    Physical exam:  Vitals: LMP 10/22/2009   GEN: This is a well developed, well-nourished female in no acute distress, in a pleasant mood.    SKIN: Full skin, which includes the head/face, both arms, chest, back, abdomen,both legs, genitalia and/or groin buttocks, digits and/or nails, was examined.  - atrophic plaque on R posterior arm (previous SCC)  - AK on L shin x1, L breast x4  - There is a verrucous papule with thrombosed capillaries interrupting dermatoglyphics on the L index finger; palmar surface.  - There are dome shaped bright red papules on the head/neck, trunk, extremities.   - Multiple regular brown pigmented macules and papules are identified on the head/neck, trunk, extremities.   - Scattered brown macules on sun exposed areas.  - There are waxy stuck on tan to brown papules on the head/neck, trunk, extremities.  - No other lesions of concern on areas examined.       Medications:  Current Outpatient Medications   Medication Sig Dispense Refill    calcipotriene (DOVONOX) 0.005 % external ointment Apply twice daily x 7 days to the forehead, temples, cheeks and nose, self mix equal parts with Efudex 5% cream 60 g 1    fluorouracil (EFUDEX) 5 % external cream Apply topically 2 times daily To the forehead, cheeks, temples and nose x 7 days. Self mix with calcipotriene 40 g 1    SUMAtriptan (IMITREX) 100 MG tablet Take 1 tablet (100 mg) by mouth See Admin Instructions WITH ONSET OF MIGRAINE, MAY REPEAT ONCE AFTER 2 HOURS. DO NOT EXCEED 2 TABLETS IN 24 HOURS. 30 tablet 3    tretinoin (RETIN-A) 0.025 % external cream Use every night 135 g 12    triamcinolone (KENALOG) 0.1 % external ointment Apply topically 2 times daily To  rashes for up to a week on the face and 2 week on the neck/chest. 60 g 0     No current facility-administered medications for this visit.      Past Medical History:   Patient Active Problem List   Diagnosis    Pain in joint, other specified sites    Adjustment disorder with mixed anxiety and depressed mood    Attention deficit hyperactivity disorder (ADHD)    Benign neoplasm of skin of trunk, except scrotum    AK (actinic keratosis)     Past Medical History:   Diagnosis Date    Actinic keratosis     Basal cell carcinoma     Closed fracture of one or more phalanges of foot 01/13/2000    (R) great toe abstract    Depressive disorder, not elsewhere classified     Hernia of unspecified site of abdominal cavity without mention of obstruction or gangrene 11/09/1998    hx (R) inguinal abstract    Post term pregnancy, unspecified episode of care 01/12/1999    childbirth baby boy abstract    Seborrheic keratosis     (L) breast, benign    Squamous cell carcinoma        CC No referring provider defined for this encounter. on close of this encounter.

## 2024-04-24 NOTE — PROGRESS NOTES
[facility] Dermatology Note  Encounter Date: Apr 24, 2024  Office Visit     Dermatology Problem List:  1. Hx of NMSC  - BCC, left neck s/p biopsy 4/20/10  - SCC, sternum s/p biopsy 8/26/11  - BCC, left preauricular cheek area s/p Mohs 2/24/2022  - SCCis, right posterior upper arm, s/p ED&C 1/9/23  2. AKs  - s/p cryo  - s/p course of Efudex x 1 month  - Current tx: tretinoin 0.025% cream, calcipotriene 0.005% ointment to the forehead, temples, cheeks, and nose BID for 1 week, self mix equal parts with efudex 5% cream  3. Pigmented AK, central upper chest s/p biopsy 12/13/17  4. Verruca  - s/p cryotherapy  Skin cancer screening 4/24/24        ____________________________________________    Assessment & Plan:     # Actinic keratoses, left chest/ breast x 4 and left shin  - cryotherapy performed today, see note below    # Hx of actinic keratoses,   - continue calcipotriene 0.005% ointment bid mixed with equal parts Efudex 5% cream twice daily for 7 days to the face every 6 months to a year, on the face.   - Discussed possible side effects of irritation, burning or pruritus      # Common wart, left index finger x 1  - cryotherapy performed today, see note below    # Skin cancer screening with multiple benign nevi, solar lentigines  - ABCDEs: Counseled ABCDEs of melanoma: Asymmetry, Border (irregularity), Color (not uniform, changes in color), Diameter (greater than 6 mm which is about the size of a pencil eraser), and Evolving (any changes in preexisting moles).  - Sun protection: Counseled SPF30+ sunscreen, UPF clothing, sun avoidance, tanning bed avoidance.    # Cherry angiomas and seborrheic keratoses,  Benign, reassurance given.       # Hx NMSC.  -No signs of recurrence  - Continue regular skin examinations  - Sun precaution was advised including the use of sun screens of SPF 30 or higher, sun protective clothing, and avoidance of tanning beds.       Procedures Performed:   Cryotherapy procedure  note: After verbal consent and discussion of risks and benefits including but no limited to dyspigmentation/scar, blister, and pain, 1 verrucous papule and 5 AK lesions was(were) treated with 1-2mm freeze border for 2 cycles with liquid nitrogen. Post cryotherapy instructions were provided.       Follow-up: 2-3 week(s) in-person, if verruca is still present. Otherwise, 1 year(s) in- person, or earlier for new or changing lesions    Staff and Scribe:   Scribe Disclosure:   By signing my name below, I, Malika Elma, attest that this documentation has been prepared under the direction and in the presence of Ree Pate PA-C.  - Electronically Signed: Malika Wills 04/24/24       Provider Disclosure:  I agree with above History, Review of Systems, Physical exam and Plan.  I have reviewed the content of the documentation and have edited it as needed. I have personally performed the services documented here and the documentation accurately represents those services and the decisions I have made.      Electronically signed by:  All risk, benefits and alternatives were discussed with patient.  Patient is in agreement and understands the assessment and plan.  All questions were answered.  Sun Screen Education was given.   Return to Clinic annually or sooner as needed.   Ree Pate PA-C        ____________________________________________    CC: Skin Check (Annual FBSE.  Spots of concern on the face) and Derm Problem (Wart on the left pointer finger )    HPI:  Ms. Brenda Carbone is a(n) 54 year old female who presents today as a return patient for a FBSC. Patient was seen on 2/27/23 for electrodesiccation and curettage of SCCs on the right upper posterior arm. She was last seen by me on 10/09/2023.    Patient reports she feels more rejuvenated and still uses her topicals.    Patient is otherwise feeling well, without additional skin concerns.     Labs Reviewed:  N/A    Physical exam:  Vitals: LMP 10/22/2009    GEN: This is a well developed, well-nourished female in no acute distress, in a pleasant mood.    SKIN: Full skin, which includes the head/face, both arms, chest, back, abdomen,both legs, genitalia and/or groin buttocks, digits and/or nails, was examined.  - atrophic plaque on R posterior arm (previous SCC)  - AK on L shin x1, L breast x4  - There is a verrucous papule with thrombosed capillaries interrupting dermatoglyphics on the L index finger; palmar surface.  - There are dome shaped bright red papules on the head/neck, trunk, extremities.   - Multiple regular brown pigmented macules and papules are identified on the head/neck, trunk, extremities.   - Scattered brown macules on sun exposed areas.  - There are waxy stuck on tan to brown papules on the head/neck, trunk, extremities.  - No other lesions of concern on areas examined.       Medications:  Current Outpatient Medications   Medication Sig Dispense Refill    calcipotriene (DOVONOX) 0.005 % external ointment Apply twice daily x 7 days to the forehead, temples, cheeks and nose, self mix equal parts with Efudex 5% cream 60 g 1    fluorouracil (EFUDEX) 5 % external cream Apply topically 2 times daily To the forehead, cheeks, temples and nose x 7 days. Self mix with calcipotriene 40 g 1    SUMAtriptan (IMITREX) 100 MG tablet Take 1 tablet (100 mg) by mouth See Admin Instructions WITH ONSET OF MIGRAINE, MAY REPEAT ONCE AFTER 2 HOURS. DO NOT EXCEED 2 TABLETS IN 24 HOURS. 30 tablet 3    tretinoin (RETIN-A) 0.025 % external cream Use every night 135 g 12    triamcinolone (KENALOG) 0.1 % external ointment Apply topically 2 times daily To rashes for up to a week on the face and 2 week on the neck/chest. 60 g 0     No current facility-administered medications for this visit.      Past Medical History:   Patient Active Problem List   Diagnosis    Pain in joint, other specified sites    Adjustment disorder with mixed anxiety and depressed mood    Attention deficit  hyperactivity disorder (ADHD)    Benign neoplasm of skin of trunk, except scrotum    AK (actinic keratosis)     Past Medical History:   Diagnosis Date    Actinic keratosis     Basal cell carcinoma     Closed fracture of one or more phalanges of foot 01/13/2000    (R) great toe abstract    Depressive disorder, not elsewhere classified     Hernia of unspecified site of abdominal cavity without mention of obstruction or gangrene 11/09/1998    hx (R) inguinal abstract    Post term pregnancy, unspecified episode of care 01/12/1999    childbirth baby boy abstract    Seborrheic keratosis     (L) breast, benign    Squamous cell carcinoma        CC No referring provider defined for this encounter. on close of this encounter.

## 2024-04-24 NOTE — PATIENT INSTRUCTIONS
Checking for Skin Cancer  You can help find cancer early by checking your skin each month. There are 3 main kinds of skin cancer: melanoma, basal cell carcinoma, and squamous cell carcinoma. Doing monthly skin checks is the best way to find new marks, sores, or skin changes. Follow these instructions for checking your skin.   The ABCDEs of checking moles for melanoma   Check your moles or growths for signs of melanoma using ABCDE:   Asymmetry: The sides of the mole or growth don t match.  Border: The edges are ragged, notched, or blurred.  Color: The color within the mole or growth varies. It could be black, brown, tan, white, or shades of red, gray, or blue.  Diameter: The mole or growth is larger than   inch or 6 mm (size of a pencil eraser).  Evolving: The size, shape, texture, or color of the mole or growth is changing.     ABCDE's of moles on light skin.        ABCDE's of moles on dark skin may be harder to identify.     Checking for other types of skin cancer  Basal cell carcinoma or squamous cell carcinoma cause symptoms like:     A spot or mole that looks different from all other marks on your skin  Changes in how an area feels, such as itching, tenderness, or pain  Changes in the skin's surface, such as oozing, bleeding, or scaliness  A sore that doesn't heal  New swelling, redness, or spread of color beyond the border of a mole    Who s at risk?  Anyone of any skin color can get skin cancer. But you're at greater risk if you have:   Fair skin that freckles easily and burns instead of tanning  Light-colored or red hair  Light-colored eyes  Many moles or abnormal moles on your skin  A long history of unprotected exposure to sunlight or tanning beds  A history of many blistering sunburns as a child or teen  A family history of skin cancer  Been exposed to radiation or chemicals  A weakened immune system  Been exposed to arsenic  If you've had skin cancer in the past, you're at high risk of having it again.    How to check your skin  Do your monthly skin checkups in front of a full-length mirror. Use a room with good lighting so it's easier to see. Use a hand mirror to look at hard-to-see places like your buttocks and back. You can also have a trusted friend or family member help you with these checks. Check every part of your body, including your:   Head (ears, face, neck, and scalp)  Torso (front, back, sides, and under breasts)  Arms (tops, undersides, and armpits)  Hands (palms, backs, and fingers, including under the nails)  Lower back, buttocks, and genitals  Legs (front, back, and sides)  Feet (tops, soles, toes, including under the nails, and between toes)  Watch for new spots on your skin or a spot that's changing in color, shape, size.   If you have a lot of moles, take digital photos of them each month. Make sure to take photos both up close and from a distance. These can help you see if any moles change over time.   Know your skin  Most skin changes aren't cancer. But if you see any changes in your skin, call your healthcare provider right away. Only they can tell you if a change is a problem. If you have skin cancer, seeing your provider can be the first step to getting the treatment that could save your life.   Rosie last reviewed this educational content on 10/1/2021    5087-3267 [employer]. All rights reserved. This information is not intended as a substitute for professional medical care. Always follow your healthcare professional's instructions.     Cryotherapy    What is it?  Use of a very cold liquid, such as liquid nitrogen, to freeze and destroy abnormal skin cells that need to be removed    What should I expect?  Tenderness and redness  A small blister that might grow and fill with dark purple blood. There may be crusting.  More than one treatment may be needed if the lesions do not go away.    How do I care for the treated area?  Gently wash the area with your hands when  bathing.  Use a thin layer of Vaseline to help with healing. You may use a Band-Aid.   The area should heal within 7-10 days and may leave behind a pink or lighter color.   Do not use an antibiotic or Neosporin ointment.   You may take acetaminophen (Tylenol) for pain.     Call your doctor if you have:  Severe pain  Signs of infection (warmth, redness, cloudy yellow drainage, and or a bad smell)  Questions or concerns    Who should I call with questions?      [facility]: [phone]      [facility]: [phone]      For urgent needs outside of business hours call the [facility] at [phone] and ask for the dermatology resident on call

## 2024-06-16 ENCOUNTER — HEALTH MAINTENANCE LETTER (OUTPATIENT)
Age: 54
End: 2024-06-16

## 2024-11-03 ENCOUNTER — HEALTH MAINTENANCE LETTER (OUTPATIENT)
Age: 54
End: 2024-11-03

## 2025-01-17 ENCOUNTER — MYC REFILL (OUTPATIENT)
Dept: DERMATOLOGY | Facility: CLINIC | Age: 55
End: 2025-01-17
Payer: COMMERCIAL

## 2025-01-17 DIAGNOSIS — L57.0 ACTINIC KERATOSIS: ICD-10-CM

## 2025-01-17 NOTE — TELEPHONE ENCOUNTER
Encounter Date: Apr 24, 2024  Office Visit      Dermatology Problem List:  1. Hx of NMSC  - BCC, left neck s/p biopsy 4/20/10  - SCC, sternum s/p biopsy 8/26/11  - BCC, left preauricular cheek area s/p Mohs 2/24/2022  - SCCis, right posterior upper arm, s/p ED&C 1/9/23  2. AKs  - s/p cryo  - s/p course of Efudex x 1 month  - Current tx: tretinoin 0.025% cream, calcipotriene 0.005% ointment to the forehead, temples, cheeks, and nose BID for 1 week, self mix equal parts with efudex 5% cream  3. Pigmented AK, central upper chest s/p biopsy 12/13/17  4. Verruca  - s/p cryotherapy  Skin cancer screening 4/24/24           ____________________________________________     Assessment & Plan:      # Actinic keratoses, left chest/ breast x 4 and left shin  - cryotherapy performed today, see note below     # Hx of actinic keratoses,   - continue calcipotriene 0.005% ointment bid mixed with equal parts Efudex 5% cream twice daily for 7 days to the face every 6 months to a year, on the face.   - Discussed possible side effects of irritation, burning or pruritus        # Common wart, left index finger x 1  - cryotherapy performed today, see note below     # Skin cancer screening with multiple benign nevi, solar lentigines  - ABCDEs: Counseled ABCDEs of melanoma: Asymmetry, Border (irregularity), Color (not uniform, changes in color), Diameter (greater than 6 mm which is about the size of a pencil eraser), and Evolving (any changes in preexisting moles).  - Sun protection: Counseled SPF30+ sunscreen, UPF clothing, sun avoidance, tanning bed avoidance.     # Cherry angiomas and seborrheic keratoses,  Benign, reassurance given.         # Hx NMSC.  -No signs of recurrence  - Continue regular skin examinations  - Sun precaution was advised including the use of sun screens of SPF 30 or higher, sun protective clothing, and avoidance of tanning beds.

## 2025-01-18 RX ORDER — FLUOROURACIL 50 MG/G
CREAM TOPICAL 2 TIMES DAILY
Qty: 40 G | Refills: 1 | Status: SHIPPED | OUTPATIENT
Start: 2025-01-18

## 2025-01-18 RX ORDER — CALCIPOTRIENE 50 UG/G
OINTMENT TOPICAL
Qty: 60 G | Refills: 1 | Status: SHIPPED | OUTPATIENT
Start: 2025-01-18

## 2025-04-18 NOTE — PATIENT INSTRUCTIONS
Cryotherapy    What is it?  Use of a very cold liquid, such as liquid nitrogen, to freeze and destroy abnormal skin cells that need to be removed    What should I expect?  Tenderness and redness  A small blister that might grow and fill with dark purple blood. There may be crusting.  More than one treatment may be needed if the lesions do not go away.    How do I care for the treated area?  Gently wash the area with your hands when bathing.  Use a thin layer of Vaseline to help with healing. You may use a Band-Aid.   The area should heal within 7-10 days and may leave behind a pink or lighter color.   Do not use an antibiotic or Neosporin ointment.   You may take acetaminophen (Tylenol) for pain.     Call your doctor if you have:  Severe pain  Signs of infection (warmth, redness, cloudy yellow drainage, and or a bad smell)  Questions or concerns    Who should I call with questions?      [facility]: [phone]      [facility]: [phone]      For urgent needs outside of business hours call the [facility] at [phone] and ask for the dermatology resident on call   Checking for Skin Cancer  You can help find cancer early by checking your skin each month. There are 3 main kinds of skin cancer: melanoma, basal cell carcinoma, and squamous cell carcinoma. Doing monthly skin checks is the best way to find new marks, sores, or skin changes. Follow these instructions for checking your skin.   The ABCDEs of checking moles for melanoma   Check your moles or growths for signs of melanoma using ABCDE:   Asymmetry: The sides of the mole or growth don t match.  Border: The edges are ragged, notched, or blurred.  Color: The color within the mole or growth varies. It could be black, brown, tan, white, or shades of red, gray, or blue.  Diameter: The mole or growth is larger than   inch or 6 mm (size of a pencil eraser).  Evolving: The size, shape,  texture, or color of the mole or growth is changing.     ABCDE's of moles on light skin.        ABCDE's of moles on dark skin may be harder to identify.     Checking for other types of skin cancer  Basal cell carcinoma or squamous cell carcinoma cause symptoms like:     A spot or mole that looks different from all other marks on your skin  Changes in how an area feels, such as itching, tenderness, or pain  Changes in the skin's surface, such as oozing, bleeding, or scaliness  A sore that doesn't heal  New swelling, redness, or spread of color beyond the border of a mole    Who s at risk?  Anyone of any skin color can get skin cancer. But you're at greater risk if you have:   Fair skin that freckles easily and burns instead of tanning  Light-colored or red hair  Light-colored eyes  Many moles or abnormal moles on your skin  A long history of unprotected exposure to sunlight or tanning beds  A history of many blistering sunburns as a child or teen  A family history of skin cancer  Been exposed to radiation or chemicals  A weakened immune system  Been exposed to arsenic  If you've had skin cancer in the past, you're at high risk of having it again.   How to check your skin  Do your monthly skin checkups in front of a full-length mirror. Use a room with good lighting so it's easier to see. Use a hand mirror to look at hard-to-see places like your buttocks and back. You can also have a trusted friend or family member help you with these checks. Check every part of your body, including your:   Head (ears, face, neck, and scalp)  Torso (front, back, sides, and under breasts)  Arms (tops, undersides, and armpits)  Hands (palms, backs, and fingers, including under the nails)  Lower back, buttocks, and genitals  Legs (front, back, and sides)  Feet (tops, soles, toes, including under the nails, and between toes)  Watch for new spots on your skin or a spot that's changing in color, shape, size.   If you have a lot of moles,  take digital photos of them each month. Make sure to take photos both up close and from a distance. These can help you see if any moles change over time.   Know your skin  Most skin changes aren't cancer. But if you see any changes in your skin, call your healthcare provider right away. Only they can tell you if a change is a problem. If you have skin cancer, seeing your provider can be the first step to getting the treatment that could save your life.   [employer] last reviewed this educational content on 10/1/2021    4506-3566 [employer]. All rights reserved. This information is not intended as a substitute for professional medical care. Always follow your healthcare professional's instructions.

## 2025-04-18 NOTE — PROGRESS NOTES
[facility] Dermatology Note  Encounter Date: Apr 21, 2025  Office Visit     Dermatology Problem List:  Skin cancer screening 4/21/25  1. Hx of NMSC  - BCC, left neck s/p biopsy 4/20/10  - SCC, sternum s/p biopsy 8/26/11  - BCC, left preauricular cheek area s/p Mohs 2/24/2022  - SCCis, right posterior upper arm, s/p ED&C 1/9/23  2. AKs  - s/p cryo  - s/p course of Efudex x 1 month  - Current tx: tretinoin 0.025% cream, calcipotriene 0.005% ointment to the forehead, temples, cheeks, and nose BID for 1 week, self mix equal parts with efudex 5% cream  3. Pigmented AK, central upper chest s/p biopsy 12/13/17  4. Verruca  - s/p cryotherapy      ____________________________________________    Assessment & Plan:    # Seborrheic keratosis, inflamed. L upper back x 2.  - Cryotherapy performed today. See procedure section.    # Hx of actinic keratoses, well controlled with current treatment.   - continue calcipotriene 0.005% ointment bid mixed with equal parts Efudex 5% cream twice daily for 5-7 days to the face once a year on the face/chest.   - Discussed possible side effects of irritation, burning or pruritus  - Continue tretinoin 0.025% cream to face nightly as tolerated. Refilled today.        # Common wart, left index finger, resolved today.        # Skin cancer screening with multiple benign nevi, solar lentigines  - ABCDEs: Counseled ABCDEs of melanoma: Asymmetry, Border (irregularity), Color (not uniform, changes in color), Diameter (greater than 6 mm which is about the size of a pencil eraser), and Evolving (any changes in preexisting moles).  - Sun protection: Counseled SPF30+ sunscreen, UPF clothing, sun avoidance, tanning bed avoidance.     # Cherry angiomas and seborrheic keratoses, sebaceous hyperplasia.   Benign, reassurance given.         # Hx NMSC.  -No signs of recurrence  - Continue regular skin examinations  - Sun precaution was advised including the use of sun screens of SPF 30 or  higher, sun protective clothing, and avoidance of tanning beds.        Procedures Performed:   - Cryotherapy procedure note, location(s): L upper back. After verbal consent and discussion of risks and benefits including, but not limited to, dyspigmentation/scar, blister, and pain, 2 lesion(s) was(were) treated with 1-2 mm freeze border for 1-2 cycles with liquid nitrogen. Post cryotherapy instructions were provided.      Follow-up: 1 year(s) in-person, or earlier for new or changing lesions    Staff and Scribe:     Scribe Disclosure:   I, Alexa Gautam, am serving as a scribe to document services personally performed by Ree Pate PA-C based on data collection and the provider's statements to me.     Provider Disclosure:   The documentation recorded by the scribe accurately reflects the services I personally performed and the decisions made by me.    All risks, benefits and alternatives were discussed with patient.  Patient is in agreement and understands the assessment and plan.  All questions were answered.  Sun Screen Education was given.   Return to Clinic annually or sooner as needed.   Ree Pate PA-C   [facility]    ____________________________________________    CC: Skin Check (Brenda is here today for a skin check. She is concerned about a lesion on the upper left back. )    HPI:  Ms. Brenda Oscar is a(n) 55 year old female who presents today as a return patient for skin check. Last seen in dermatology 4/24/24 by me for skin check.    Today, patient reports area of concern on the upper L back.       Patient is otherwise feeling well, without additional skin concerns.    Labs Reviewed:  N/A    Physical Exam:  Vitals: LMP 10/22/2009   SKIN: Full skin, which includes the head/face, both arms, chest, back, abdomen,both legs, genitalia and/or groin buttocks, digits and/or nails, was examined.  - Waxy stuck on papules with erythema on the L upper back x 2.   -  There are dome shaped bright red papules on the trunk and extremities .   - Multiple regular brown pigmented macules and papules are identified on the trunk and extremities. .   - Scattered brown macules on sun exposed areas.  - Waxy stuck on papules and plaques on trunk and extremities.   - No gritty papules on the face or chest.  There are a few faint yellowish umbilicated papules on her forehead and medial cheeks.   - No other lesions of concern on areas examined.     Medications:  Current Outpatient Medications   Medication Sig Dispense Refill    calcipotriene (DOVONOX) 0.005 % external ointment Apply twice daily x 5-7 days to the forehead, temples, cheeks and nose, self mix equal parts with Efudex 5% cream, as tolerated. Will cause redness and irritation. 60 g 1    fluorouracil (EFUDEX) 5 % external cream Apply topically 2 times daily. To the forehead, cheeks, temples and nose x 5-7 days as tolerated. Self mix with equal parts calcipotriene. Redness and irritation will occur. 40 g 1    SUMAtriptan (IMITREX) 100 MG tablet Take 1 tablet (100 mg) by mouth See Admin Instructions WITH ONSET OF MIGRAINE, MAY REPEAT ONCE AFTER 2 HOURS. DO NOT EXCEED 2 TABLETS IN 24 HOURS. 30 tablet 3    tretinoin (RETIN-A) 0.025 % external cream Use every night 135 g 12    triamcinolone (KENALOG) 0.1 % external ointment Apply topically 2 times daily To rashes for up to a week on the face and 2 week on the neck/chest. 60 g 0     No current facility-administered medications for this visit.      Past Medical History:   Patient Active Problem List   Diagnosis    Pain in joint, other specified sites    Adjustment disorder with mixed anxiety and depressed mood    Attention deficit hyperactivity disorder (ADHD)    Benign neoplasm of skin of trunk, except scrotum    AK (actinic keratosis)     Past Medical History:   Diagnosis Date    Actinic keratosis     Basal cell carcinoma     Closed fracture of one or more phalanges of foot 01/13/2000     (R) great toe abstract    Depressive disorder, not elsewhere classified     Hernia of unspecified site of abdominal cavity without mention of obstruction or gangrene 11/09/1998    hx (R) inguinal abstract    Post term pregnancy, unspecified episode of care 01/12/1999    childbirth baby boy abstract    Seborrheic keratosis     (L) breast, benign    Squamous cell carcinoma         CC Ree Pate PA-C  [address] [place] [phone] on close of this encounter.

## 2025-04-21 ENCOUNTER — OFFICE VISIT (OUTPATIENT)
Dept: DERMATOLOGY | Facility: CLINIC | Age: 55
End: 2025-04-21
Attending: PHYSICIAN ASSISTANT
Payer: COMMERCIAL

## 2025-04-21 DIAGNOSIS — L73.8 SENILE SEBACEOUS GLAND HYPERPLASIA: ICD-10-CM

## 2025-04-21 DIAGNOSIS — L57.0 ACTINIC KERATOSIS: ICD-10-CM

## 2025-04-21 DIAGNOSIS — Z85.828 HISTORY OF NONMELANOMA SKIN CANCER: ICD-10-CM

## 2025-04-21 DIAGNOSIS — L81.4 SOLAR LENTIGO: ICD-10-CM

## 2025-04-21 DIAGNOSIS — D18.01 CHERRY ANGIOMA: ICD-10-CM

## 2025-04-21 DIAGNOSIS — Z12.83 SKIN CANCER SCREENING: Primary | ICD-10-CM

## 2025-04-21 DIAGNOSIS — L82.0 INFLAMED SEBORRHEIC KERATOSIS: ICD-10-CM

## 2025-04-21 DIAGNOSIS — L82.1 SEBORRHEIC KERATOSES: ICD-10-CM

## 2025-04-21 DIAGNOSIS — D22.9 MULTIPLE BENIGN NEVI: ICD-10-CM

## 2025-04-21 PROCEDURE — 99213 OFFICE O/P EST LOW 20 MIN: CPT | Mod: 25 | Performed by: PHYSICIAN ASSISTANT

## 2025-04-21 PROCEDURE — 17110 DESTRUCTION B9 LES UP TO 14: CPT | Performed by: PHYSICIAN ASSISTANT

## 2025-04-21 PROCEDURE — 1126F AMNT PAIN NOTED NONE PRSNT: CPT | Performed by: PHYSICIAN ASSISTANT

## 2025-04-21 RX ORDER — TRETINOIN 0.25 MG/G
CREAM TOPICAL
Qty: 135 G | Refills: 3 | Status: SHIPPED | OUTPATIENT
Start: 2025-04-21

## 2025-04-21 ASSESSMENT — PAIN SCALES - GENERAL: PAINLEVEL_OUTOF10: NO PAIN (0)

## 2025-04-21 NOTE — NURSING NOTE
Dermatology Rooming Note    Brenda Oscar's goals for this visit include:   Chief Complaint   Patient presents with    Skin Check     Brenda is here today for a skin check. She is concerned about a lesion on the upper left back.      Prakash BERNARDO CMA

## 2025-04-21 NOTE — LETTER
4/21/2025       RE: Brenda Oscar  [address]  [facility] [address]     Dear Colleague,    Thank you for referring your patient, Brenda Oscar, to the [facility] [place] at [facility]. Please see a copy of my visit note below.      [facility] Dermatology Note  Encounter Date: Apr 21, 2025  Office Visit     Dermatology Problem List:  Skin cancer screening 4/21/25  1. Hx of NMSC  - BCC, left neck s/p biopsy 4/20/10  - SCC, sternum s/p biopsy 8/26/11  - BCC, left preauricular cheek area s/p Mohs 2/24/2022  - SCCis, right posterior upper arm, s/p ED&C 1/9/23  2. AKs  - s/p cryo  - s/p course of Efudex x 1 month  - Current tx: tretinoin 0.025% cream, calcipotriene 0.005% ointment to the forehead, temples, cheeks, and nose BID for 1 week, self mix equal parts with efudex 5% cream  3. Pigmented AK, central upper chest s/p biopsy 12/13/17  4. Verruca  - s/p cryotherapy      ____________________________________________    Assessment & Plan:    # Seborrheic keratosis, inflamed. L upper back x 2.  - Cryotherapy performed today. See procedure section.    # Hx of actinic keratoses, well controlled with current treatment.   - continue calcipotriene 0.005% ointment bid mixed with equal parts Efudex 5% cream twice daily for 5-7 days to the face once a year on the face/chest.   - Discussed possible side effects of irritation, burning or pruritus  - Continue tretinoin 0.025% cream to face nightly as tolerated. Refilled today.        # Common wart, left index finger, resolved today.        # Skin cancer screening with multiple benign nevi, solar lentigines  - ABCDEs: Counseled ABCDEs of melanoma: Asymmetry, Border (irregularity), Color (not uniform, changes in color), Diameter (greater than 6 mm which is about the size of a pencil eraser), and Evolving (any changes in preexisting moles).  - Sun protection: Counseled SPF30+  sunscreen, UPF clothing, sun avoidance, tanning bed avoidance.     # Cherry angiomas and seborrheic keratoses, sebaceous hyperplasia.   Benign, reassurance given.         # Hx NMSC.  -No signs of recurrence  - Continue regular skin examinations  - Sun precaution was advised including the use of sun screens of SPF 30 or higher, sun protective clothing, and avoidance of tanning beds.        Procedures Performed:   - Cryotherapy procedure note, location(s): L upper back. After verbal consent and discussion of risks and benefits including, but not limited to, dyspigmentation/scar, blister, and pain, 2 lesion(s) was(were) treated with 1-2 mm freeze border for 1-2 cycles with liquid nitrogen. Post cryotherapy instructions were provided.      Follow-up: 1 year(s) in-person, or earlier for new or changing lesions    Staff and Scribe:     Scribe Disclosure:   I, Alexa Gautam, am serving as a scribe to document services personally performed by Ree Pate PA-C based on data collection and the provider's statements to me.     Provider Disclosure:   The documentation recorded by the scribe accurately reflects the services I personally performed and the decisions made by me.    All risks, benefits and alternatives were discussed with patient.  Patient is in agreement and understands the assessment and plan.  All questions were answered.  Sun Screen Education was given.   Return to Clinic annually or sooner as needed.   Ree Pate PA-C   [facility]    ____________________________________________    CC: Skin Check (Brenda is here today for a skin check. She is concerned about a lesion on the upper left back. )    HPI:  Ms. Brenda Oscar is a(n) 55 year old female who presents today as a return patient for skin check. Last seen in dermatology 4/24/24 by me for skin check.    Today, patient reports area of concern on the upper L back.       Patient is otherwise feeling well, without  additional skin concerns.    Labs Reviewed:  N/A    Physical Exam:  Vitals: LMP 10/22/2009   SKIN: Full skin, which includes the head/face, both arms, chest, back, abdomen,both legs, genitalia and/or groin buttocks, digits and/or nails, was examined.  - Waxy stuck on papules with erythema on the L upper back x 2.   - There are dome shaped bright red papules on the trunk and extremities .   - Multiple regular brown pigmented macules and papules are identified on the trunk and extremities. .   - Scattered brown macules on sun exposed areas.  - Waxy stuck on papules and plaques on trunk and extremities.   - No gritty papules on the face or chest.  There are a few faint yellowish umbilicated papules on her forehead and medial cheeks.   - No other lesions of concern on areas examined.     Medications:  Current Outpatient Medications   Medication Sig Dispense Refill     calcipotriene (DOVONOX) 0.005 % external ointment Apply twice daily x 5-7 days to the forehead, temples, cheeks and nose, self mix equal parts with Efudex 5% cream, as tolerated. Will cause redness and irritation. 60 g 1     fluorouracil (EFUDEX) 5 % external cream Apply topically 2 times daily. To the forehead, cheeks, temples and nose x 5-7 days as tolerated. Self mix with equal parts calcipotriene. Redness and irritation will occur. 40 g 1     SUMAtriptan (IMITREX) 100 MG tablet Take 1 tablet (100 mg) by mouth See Admin Instructions WITH ONSET OF MIGRAINE, MAY REPEAT ONCE AFTER 2 HOURS. DO NOT EXCEED 2 TABLETS IN 24 HOURS. 30 tablet 3     tretinoin (RETIN-A) 0.025 % external cream Use every night 135 g 12     triamcinolone (KENALOG) 0.1 % external ointment Apply topically 2 times daily To rashes for up to a week on the face and 2 week on the neck/chest. 60 g 0     No current facility-administered medications for this visit.      Past Medical History:   Patient Active Problem List   Diagnosis     Pain in joint, other specified sites     Adjustment  disorder with mixed anxiety and depressed mood     Attention deficit hyperactivity disorder (ADHD)     Benign neoplasm of skin of trunk, except scrotum     AK (actinic keratosis)     Past Medical History:   Diagnosis Date     Actinic keratosis      Basal cell carcinoma      Closed fracture of one or more phalanges of foot 01/13/2000    (R) great toe abstract     Depressive disorder, not elsewhere classified      Hernia of unspecified site of abdominal cavity without mention of obstruction or gangrene 11/09/1998    hx (R) inguinal abstract     Post term pregnancy, unspecified episode of care 01/12/1999    childbirth baby boy abstract     Seborrheic keratosis     (L) breast, benign     Squamous cell carcinoma         CC Ree Pate PA-C  [address] [place], [place] [phone] on close of this encounter.      Again, thank you for allowing me to participate in the care of your patient.      Sincerely,    Ree Pate PA-C